# Patient Record
Sex: FEMALE | Race: WHITE
[De-identification: names, ages, dates, MRNs, and addresses within clinical notes are randomized per-mention and may not be internally consistent; named-entity substitution may affect disease eponyms.]

---

## 2017-01-02 ENCOUNTER — HOSPITAL ENCOUNTER (EMERGENCY)
Dept: HOSPITAL 58 - ED | Age: 72
Discharge: HOME | End: 2017-01-02

## 2017-01-02 VITALS — BODY MASS INDEX: 20.6 KG/M2

## 2017-01-02 VITALS — DIASTOLIC BLOOD PRESSURE: 68 MMHG | SYSTOLIC BLOOD PRESSURE: 127 MMHG | TEMPERATURE: 98.9 F

## 2017-01-02 DIAGNOSIS — J18.9: Primary | ICD-10-CM

## 2017-01-02 DIAGNOSIS — Z79.899: ICD-10-CM

## 2017-01-02 LAB
FLU INTERNAL QC: (no result)
FLUAV AG NPH QL: NEGATIVE
FLUBV AG NPH QL: NEGATIVE

## 2017-01-02 PROCEDURE — 99283 EMERGENCY DEPT VISIT LOW MDM: CPT

## 2017-01-02 PROCEDURE — 87880 STREP A ASSAY W/OPTIC: CPT

## 2017-01-02 PROCEDURE — 96372 THER/PROPH/DIAG INJ SC/IM: CPT

## 2017-01-02 PROCEDURE — 87804 INFLUENZA ASSAY W/OPTIC: CPT

## 2017-01-02 PROCEDURE — 87651 STREP A DNA AMP PROBE: CPT

## 2017-01-02 NOTE — ED.PDOC
General


ED Provider: 


Dr. RICKIE WILSON JR





Chief Complaint: Cough


Stated Complaint: Frequent cough. Swollen throat. Hurts to swallow. Had flu 

vaccine this year. Cough worse at noc. Difficult to sleep.[End]4 days 98.9 82 

20 96% 127/68 10/10 hysterectomy, breast biopsy, gb surg, tonsillectomy, foot 

surgery.  [ End ]osteoporosis arth


Time Seen by Physician: 10:10


Mode of Arrival: Walk-In


Information Source: Patient


Exam Limitations: No limitations


Primary Care Provider: 


LUCIANO CASPER





Nursing and Triage Documentation Reviewed and Agree: No





Review of Systems





- Review Of Systems


Constitutional: Reports: No symptoms


Eyes: Reports: No symptoms


Ears, Nose, Mouth, Throat: Reports: Throat pain


Respiratory: Reports: Cough


Cardiac: Reports: No symptoms


GI: Reports: No symptoms


: Reports: No symptoms


Musculoskeletal: Reports: No symptoms


Skin: Reports: No symptoms


Neurological: Reports: No symptoms


Endocrine: Reports: No symptoms


Hematologic/Lymphatic: Reports: No symptoms


All Other Systems: Other





Past Medical History





- Past Medical History


Endocrine: Reports: None


Cardiovascular: Reports: None


Respiratory: Reports: Pneumonia (?2015)


Hematological: Reports: None


Gastrointestinal: Reports: None


Genitourinary: Reports: None


Neuro/Psych: Reports: None


Musculoskeletal: Reports: None


Cancer: Reports: None


Last Menstrual Period: hysterectomy





- Surgical History


General Surgical History: Reports: None, Hysterectomy





- Family History


Family History: Reports: Other (daughter with [employer] teaches , daughter in 

law with illness and two grandchildren with urti symptoms and sore throats)





- Social History


Smoking Status: Never smoker


Hx Substance Use: No


Alcohol Screening: None





Physical Exam





- Physical Exam


Appearance: Well-appearing


Ill-appearing: Mild


Pain Distress: Mild


Eyes: NICHOLAS, EOMI, Conjunctiva clear


ENT: Ears normal, Nose normal, Oropharynx normal


Neck: Supple


Respiratory: Rhonchi


Cardiovascular: RRR, Pulses normal, No rub, No murmur


GI/: Soft, Nontender, No masses, Bowel sounds normal, No Organomegaly


Musculoskeletal: Normal strength, ROM intact, No edema, No calf tenderness


Skin: Warm, Dry, Normal color


Neurological: Sensation intact, Motor intact, Reflexes intact, Cranial nerves 

intact, Alert, Oriented


Psychiatric: Affect appropriate, Mood appropriate





Interpretation





- Radiology Interpretation


Radiology Interpretation By: Radiologist


Radiology Results: Negative


Exam Interpreted: CXR





Critical Care Note





- Critical Care Note


Total Time (mins): 0





Course





- Course


Orders, Labs, Meds: 


Lab Review











  01/02/17





  10:45


 


Influenza A (Rapid)  Negative


 


Influenza B (Rapid)  Negative








Orders











 Category Date Time Status


 


 MOLECULAR GROUP A STREP Stat LAB  01/02/17 10:45 Results


 


 RAPID FLU A/B Stat LAB  01/02/17 10:45 Completed


 


 STREP SCREEN Stat LAB  01/02/17 10:45 Results


 


 Benzonatate [Tessalon Perles] MEDS  01/02/17 10:09 Discontinued





 200 mg PO ONCE STA   


 


 Methylprednisolone Sod Succ/Pf [Solu-Medrol 125 mg] MEDS  01/02/17 11:49 

Discontinued





 125 mg IM ONCE STA   


 


 Sulfamethoxazole/Trimethoprim [Bactrim Ds 800/160 mg] MEDS  01/02/17 11:50 

Discontinued





 1 tab PO ONCE STA   


 


 CHEST, 2 VIEWS PA & LAT Stat RADS  01/02/17 10:07 Completed








Medications














Discontinued Medications














Generic Name Dose Route Start Last Admin





  Trade Name Freq  PRN Reason Stop Dose Admin


 


Benzonatate  200 mg  01/02/17 10:09  01/02/17 10:36





  Tessalon Perles  PO  01/02/17 10:10  100 mg





  ONCE STA   Administration


 


Methylprednisolone Sodium Succinate  125 mg  01/02/17 11:49  01/02/17 12:02





  Solu-Medrol 125 Mg  IM  01/02/17 11:50  125 mg





  ONCE STA   Administration


 


Trimethoprim/Sulfamethoxazole  1 tab  01/02/17 11:50  01/02/17 12:03





  Bactrim Ds 800/160 Mg  PO  01/02/17 11:51  1 tab





  ONCE STA   Administration











Vital Signs: 


 











  Temp Pulse Resp BP Pulse Ox


 


 01/02/17 09:26  98.9 F  82  20  127/68  96














Departure





- Departure


Time of Disposition: 11:50


Disposition: HOME SELF-CARE


Discharge Problem: 


 Pneumonia





Instructions:  Pneumonitis (ED), Community Acquired Pneumonia (ED)


Condition: Good


Pt referred to PMD for follow-up: Yes


Additional Instructions: 


return if fever over 101.0 or if worsening


Solumedrol given by injection


Analislon Perles or Robitussin AC for cough


Bactrim antibiotic until gone


Please follow-up with Dr. Casper in 1-5 days.





Prescriptions: 


Sulfamethoxazole/Trimethoprim [Bactrim Ds Tablet] 1 tab PO Q12HR #20 tablet


Benzonatate [Tessalon Perles] 200 mg PO TID PRN #20 capsule


 PRN Reason: Cough


Guaifenesin/Codeine Phosphate [Robitussin AC Syrup] 10 ml PO Q6H PRN #240 ml


 PRN Reason: Cough


Allergies/Adverse Reactions: 


Allergies





Penicillins Adverse Reaction (Verified 01/02/17 09:36)


 








Home Medications: 


Ambulatory Orders





Alprazolam [Xanax] 1 tab PO QPM PRN 01/27/14 


Azelastine HCl [Optivar] 1 drop EACHEYE BID 01/27/14 


Bimatoprost Opth [Lumigan] 1 drop EACHEYE BID 01/27/14 


Dorzolamide HCl/Timolol Maleat [Dorzolamide-Timolol Eye Drops] 1 drop EACHEYE 

TID 01/27/14 


Metoprolol Succinate [Toprol Xl] 1 tab PO BID 01/27/14 


Benzonatate [Tessalon Perles] 200 mg PO TID PRN #20 capsule 01/02/17 


Guaifenesin/Codeine Phosphate [Robitussin AC Syrup] 10 ml PO Q6H PRN #240 ml 01/ 02/17 


Montelukast Sodium [Singulair] 10 mg PO DAILY PRN 01/02/17 


Rosuvastatin Calcium [Crestor] 10 mg PO EVERY OTHER DAY 01/02/17 


Sulfamethoxazole/Trimethoprim [Bactrim Ds Tablet] 1 tab PO Q12HR #20 tablet 01/ 02/17

## 2017-01-02 NOTE — DI
EXAM:  Chest two views 

  

HISTORY:  Cough 

  

COMPARISON:  01/08/2015 

  

TECHNIQUE:  Two views of the chest were performed 

  

FINDINGS:  No airspace consolidation.  There is granulomatous calcification There is emphysematous c
hange There is no pleural effusion or pneumothorax.  The heart is normal in size.  The mediastinal c
ontour is normal.  There are no acute abnormalities of the bones. 

  

IMPRESSION:  No acute cardiopulmonary process. Emphysematous change.

## 2017-01-30 ENCOUNTER — OFFICE VISIT (OUTPATIENT)
Dept: OBSTETRICS AND GYNECOLOGY | Facility: CLINIC | Age: 72
End: 2017-01-30

## 2017-01-30 VITALS
SYSTOLIC BLOOD PRESSURE: 120 MMHG | HEIGHT: 61 IN | WEIGHT: 114 LBS | DIASTOLIC BLOOD PRESSURE: 60 MMHG | BODY MASS INDEX: 21.52 KG/M2

## 2017-01-30 DIAGNOSIS — R39.15 URGENCY OF URINATION: Primary | ICD-10-CM

## 2017-01-30 PROCEDURE — 99213 OFFICE O/P EST LOW 20 MIN: CPT | Performed by: OBSTETRICS & GYNECOLOGY

## 2017-01-30 NOTE — MR AVS SNAPSHOT
Michel Bookerzuleyma   2017 10:00 AM   Office Visit    Dept Phone:  [phone]   Encounter #:  [id number]    Provider:  Mirian Salinas MD   Department:  [facility]                Your Full Care Plan              Your Updated Medication List          This list is accurate as of: 17 10:52 AM.  Always use your most recent med list.                ALPHAGAN P 0.1 % solution ophthalmic solution   Generic drug:  brimonidine       ALPRAZolam 0.5 MG tablet   Commonly known as:  XANAX       aspirin 81 MG tablet       CALCIUM + D PO       conjugated estrogens 0.625 MG/GM vaginal cream   Commonly known as:  PREMARIN   Insert  into the vagina 2 (Two) Times a Week.  applicator       CRESTOR PO       dorzolamide 2 % ophthalmic solution   Commonly known as:  TRUSOPT       methylcellulose (Laxative) 500 MG tablet tablet   Commonly known as:  CITRUCEL       metoprolol succinate XL 25 MG 24 hr tablet   Commonly known as:  TOPROL-XL       montelukast 10 MG tablet   Commonly known as:  SINGULAIR       MULTIVITAMIN ADULT PO       PROLIA 60 MG/ML solution syringe   Generic drug:  denosumab       solifenacin 5 MG tablet   Commonly known as:  VESICARE   Take 1 tablet by mouth Daily.       taflurprost 0.0015 % solution ophthalmic solution   Commonly known as:  ZIOPTAN               Instructions     None    Patient Instructions History      Upcoming Appointments     Visit Type Date Time Department    OFFICE VISIT 2017 10:00 AM LULU SALGADO      [employer] Signup     [facility] [employer] allows you to send messages to your doctor, view your test results, renew your prescriptions, schedule appointments, and more. To sign up, go to [employer] and click on the Sign Up Now link in the New User? box. Enter your [employer] Activation Code exactly as it appears below along with the last four digits of your Social Security Number and your Date of Birth () to complete  "the sign-up process. If you do not sign up before the expiration date, you must request a new code.    [employer] Activation Code: [email or web address]  Expires: 2/13/2017 10:52 AM    If you have questions, you can email [email or web address] or call [phone] to talk to our [employer] staff. Remember, [employer] is NOT to be used for urgent needs. For medical emergencies, dial 911.               Other Info from Your Visit           Your Appointments     Feb 01, 2018 10:00 AM CST   Physical with Mirian Salinas MD   [facility] [place] [facility] (--)    [address] [place] [id number] [phone]           Arrive 15 minutes prior to appointment.              Allergies     Penicillins  Rash      Reason for Visit     Follow-up Pt here for 1 month follow up on medication. Pt states they are working well for her. Pt voices no complaints or concerns.       Vital Signs     Blood Pressure Height Weight Body Mass Index Smoking Status       120/60 61\" (154.9 cm) 114 lb (51.7 kg) 21.54 kg/m2 Never Smoker         "

## 2017-01-30 NOTE — PROGRESS NOTES
Darlene Cruz is a 72 y.o. female for a medication check.    History of Present Illness  Pt is a 73 yo WF who was evaluated 1 month ago for urgency and was found to have an atrophic vagina.  She was placed on Premarin Vaginal cream and Vesicare 5 mg.    The following portions of the patient's history were reviewed and updated as appropriate: allergies, current medications, past family history, past medical history, past social history, past surgical history and problem list.    Review of Systems   All other systems reviewed and are negative.      Objective   Physical Exam   Constitutional: She is oriented to person, place, and time. She appears well-developed and well-nourished.   HENT:   Head: Normocephalic.   Eyes: Pupils are equal, round, and reactive to light.   Neck: Normal range of motion.   Cardiovascular: Normal rate.    Pulmonary/Chest: Effort normal.   Abdominal: Soft.   Genitourinary: Uterus normal. Rectal exam shows anal tone normal. Pelvic exam was performed with patient supine. No labial fusion. There is no rash, tenderness, lesion or injury on the right labia. There is no rash, tenderness, lesion or injury on the left labia. Uterus is not enlarged and not tender. Cervix exhibits no motion tenderness, no discharge and no friability. Right adnexum displays no mass, no tenderness and no fullness. Left adnexum displays no mass, no tenderness and no fullness. No erythema, tenderness or bleeding in the vagina. No signs of injury around the vagina. No vaginal discharge found.   Genitourinary Comments: Atrophy of vaginal wall is improving   Musculoskeletal: Normal range of motion.   Neurological: She is alert and oriented to person, place, and time.   Skin: Skin is warm and dry.   Psychiatric: She has a normal mood and affect.   Nursing note and vitals reviewed.      Assessment/Plan   Vaginal atrophy is much improved as well as is her urgency.  Continue Premarin Vaginal Cream and  Vesicare.

## 2017-05-15 ENCOUNTER — HOSPITAL ENCOUNTER (OUTPATIENT)
Dept: HOSPITAL 58 - OPMED | Age: 72
Discharge: HOME | End: 2017-05-15
Attending: INTERNAL MEDICINE

## 2017-05-15 VITALS — BODY MASS INDEX: 20.6 KG/M2

## 2017-05-15 VITALS — SYSTOLIC BLOOD PRESSURE: 123 MMHG | DIASTOLIC BLOOD PRESSURE: 59 MMHG | TEMPERATURE: 97.7 F

## 2017-05-15 DIAGNOSIS — M81.0: Primary | ICD-10-CM

## 2017-05-15 PROCEDURE — 96372 THER/PROPH/DIAG INJ SC/IM: CPT

## 2017-09-05 ENCOUNTER — HOSPITAL ENCOUNTER (OUTPATIENT)
Dept: HOSPITAL 58 - RAD | Age: 72
Discharge: HOME | End: 2017-09-05
Attending: INTERNAL MEDICINE

## 2017-09-05 VITALS — BODY MASS INDEX: 20.6 KG/M2

## 2017-09-05 DIAGNOSIS — R07.9: Primary | ICD-10-CM

## 2017-09-05 NOTE — DI
EXAM: PA and lateral views of the chest 

  

HISTORY:  Chest pain and pressure 

  

COMPARISON:  Chest x-ray 01/02/2017 and multiple priors 

  

FINDINGS:  The cardiomediastinal silhouette is normal.  There is no pneumothorax or pleural effusion
.  There is no consolidation, nodule or mass. Lungs are mildly hyperinflated. The osseous structures
 are unremarkable. There are surgical clips in right upper quadrant. 

  

IMPRESSION:  No acute cardiopulmonary process with findings suggestive of chronic obstructive pulmon
mili disease.

## 2017-10-24 ENCOUNTER — HOSPITAL ENCOUNTER (OUTPATIENT)
Dept: HOSPITAL 58 - RAD | Age: 72
Discharge: HOME | End: 2017-10-24
Attending: SPECIALIST

## 2017-10-24 VITALS — BODY MASS INDEX: 20.6 KG/M2

## 2017-10-24 DIAGNOSIS — Z12.31: Primary | ICD-10-CM

## 2017-10-24 PROCEDURE — 77067 SCR MAMMO BI INCL CAD: CPT

## 2017-10-25 NOTE — MAMMO
EXAM:  Bilateral digital screening mammogram (2-D and 3-D) 

  

Comparison:  Bilateral mammogram 10/20/2016 

  

History:  Screening 

  

Findings:  MLO and CC views of bilateral breasts demonstrate scattered fibroglandular breast parenchy
ma.  CAD was reviewed by the radiologist.  Tomosynthesis was performed. There are no dominant masses,
 no suspicious microcalcifications and no architectural distortions 

  

Impression:  Stable negative mammogram.  Recommend followup routine screening mammography in 1 year. 


  

BIRADS 1

## 2018-01-17 ENCOUNTER — HOSPITAL ENCOUNTER (OUTPATIENT)
Dept: HOSPITAL 58 - OUTPT | Age: 73
End: 2018-01-17
Attending: OTOLARYNGOLOGY

## 2018-01-17 VITALS — BODY MASS INDEX: 20.6 KG/M2

## 2018-01-17 DIAGNOSIS — R42: Primary | ICD-10-CM

## 2018-01-17 PROCEDURE — 92567 TYMPANOMETRY: CPT

## 2018-01-17 PROCEDURE — 92557 COMPREHENSIVE HEARING TEST: CPT

## 2018-01-22 RX ORDER — SOLIFENACIN SUCCINATE 5 MG/1
TABLET, FILM COATED ORAL
Qty: 30 TABLET | Refills: 0 | Status: SHIPPED | OUTPATIENT
Start: 2018-01-22 | End: 2018-02-01 | Stop reason: SDUPTHER

## 2018-02-01 ENCOUNTER — OFFICE VISIT (OUTPATIENT)
Dept: OBSTETRICS AND GYNECOLOGY | Facility: CLINIC | Age: 73
End: 2018-02-01

## 2018-02-01 VITALS
SYSTOLIC BLOOD PRESSURE: 128 MMHG | HEIGHT: 61 IN | BODY MASS INDEX: 20.58 KG/M2 | WEIGHT: 109 LBS | DIASTOLIC BLOOD PRESSURE: 72 MMHG

## 2018-02-01 DIAGNOSIS — Z01.419 ENCOUNTER FOR GYNECOLOGICAL EXAMINATION WITHOUT ABNORMAL FINDING: Primary | ICD-10-CM

## 2018-02-01 PROCEDURE — G0101 CA SCREEN;PELVIC/BREAST EXAM: HCPCS | Performed by: OBSTETRICS & GYNECOLOGY

## 2018-02-01 RX ORDER — DIAZEPAM 5 MG/1
5 TABLET ORAL EVERY 12 HOURS PRN
COMMUNITY
End: 2021-10-06

## 2018-02-01 RX ORDER — SOLIFENACIN SUCCINATE 5 MG/1
5 TABLET, FILM COATED ORAL DAILY
Qty: 90 TABLET | Refills: 3 | Status: SHIPPED | OUTPATIENT
Start: 2018-02-01 | End: 2021-10-06

## 2018-02-01 NOTE — PROGRESS NOTES
Darlene Cruz is a 73 y.o. female who presents for a yearly gyn exam.    History of Present Illness  Patient has had a hysterectomy and she is on Premarin vaginal cream, Vesicare.  The following portions of the patient's history were reviewed and updated as appropriate: allergies, current medications, past family history, past medical history, past social history, past surgical history and problem list.    Review of Systems   Constitutional: Negative for fatigue and unexpected weight change.   HENT: Negative.    Eyes: Negative.    Respiratory: Negative for cough, chest tightness and shortness of breath.    Cardiovascular: Negative for chest pain, palpitations and leg swelling.   Gastrointestinal: Negative for abdominal distention, abdominal pain, anal bleeding, blood in stool, constipation, diarrhea, nausea and vomiting.   Endocrine: Negative for polydipsia and polyuria.   Genitourinary: Positive for urgency. Negative for difficulty urinating, dyspareunia, dysuria, frequency, genital sores, hematuria, menstrual problem, pelvic pain, vaginal bleeding, vaginal discharge and vaginal pain.   Musculoskeletal: Negative.    Skin: Negative for color change and rash.   Allergic/Immunologic: Negative.    Neurological: Negative.  Negative for dizziness, seizures, syncope, light-headedness and headaches.   Hematological: Negative for adenopathy. Does not bruise/bleed easily.   Psychiatric/Behavioral: Negative for agitation, confusion, dysphoric mood, sleep disturbance and suicidal ideas. The patient is not nervous/anxious.        Objective   Physical Exam   Constitutional: She is oriented to person, place, and time. She appears well-developed and well-nourished.   HENT:   Head: Normocephalic.   Eyes: Conjunctivae are normal. Pupils are equal, round, and reactive to light. Right eye exhibits no discharge. Left eye exhibits no discharge.   Neck: Normal range of motion. Neck supple. No tracheal deviation present. No  thyromegaly present.   Cardiovascular: Normal rate, regular rhythm and normal heart sounds.    Pulmonary/Chest: Effort normal and breath sounds normal. She has no wheezes. She has no rales. Right breast exhibits no inverted nipple, no mass, no nipple discharge, no skin change and no tenderness. Left breast exhibits no inverted nipple, no mass, no nipple discharge, no skin change and no tenderness. Breasts are symmetrical. There is no breast swelling.   Abdominal: Soft. Bowel sounds are normal. She exhibits no distension and no mass. There is no tenderness. There is no rebound and no guarding. No hernia. Hernia confirmed negative in the right inguinal area and confirmed negative in the left inguinal area.   Genitourinary: Rectum normal and vagina normal. Rectal exam shows no external hemorrhoid, no internal hemorrhoid, no fissure, no mass, no tenderness and anal tone normal. No breast tenderness, discharge or bleeding. Pelvic exam was performed with patient supine. No labial fusion. There is no rash, tenderness, lesion or injury on the right labia. There is no rash, tenderness, lesion or injury on the left labia. Cervix exhibits no motion tenderness, no discharge and no friability. Right adnexum displays no mass, no tenderness and no fullness. Left adnexum displays no mass, no tenderness and no fullness. No erythema, tenderness or bleeding in the vagina. No foreign body in the vagina. No signs of injury around the vagina. No vaginal discharge found.   Genitourinary Comments: BUS glands appear nl, perineum intact, urethral orifice appears nl, vagina has good support. Uterus is absent and vagina is atrophic.   Musculoskeletal: Normal range of motion.   Lymphadenopathy:        Right: No inguinal adenopathy present.        Left: No inguinal adenopathy present.   Neurological: She is alert and oriented to person, place, and time. She has normal reflexes.   Skin: Skin is warm and dry. No rash noted.   Psychiatric: She has  a normal mood and affect. Her behavior is normal. Judgment and thought content normal.   Nursing note and vitals reviewed.        Assessment/Plan     WWE  Mammo done and followed by Dr Nesbitt  Continue Vesicare 5 mg  Continue Premarin Vaginal cream.

## 2018-09-17 ENCOUNTER — OFFICE VISIT (OUTPATIENT)
Dept: UROLOGY | Age: 73
End: 2018-09-17
Payer: MEDICARE

## 2018-09-17 VITALS
HEART RATE: 63 BPM | BODY MASS INDEX: 19.51 KG/M2 | HEIGHT: 62 IN | TEMPERATURE: 96.3 F | DIASTOLIC BLOOD PRESSURE: 65 MMHG | SYSTOLIC BLOOD PRESSURE: 106 MMHG | WEIGHT: 106 LBS

## 2018-09-17 DIAGNOSIS — R35.0 URINARY FREQUENCY: Primary | ICD-10-CM

## 2018-09-17 DIAGNOSIS — N32.81 OAB (OVERACTIVE BLADDER): ICD-10-CM

## 2018-09-17 LAB
BILIRUBIN, POC: NORMAL
BLOOD URINE, POC: NORMAL
CLARITY, POC: CLEAR
COLOR, POC: YELLOW
GLUCOSE URINE, POC: NORMAL
KETONES, POC: NORMAL
LEUKOCYTE EST, POC: NORMAL
NITRITE, POC: NORMAL
PH, POC: 5.5
PROTEIN, POC: NORMAL
SPECIFIC GRAVITY, POC: 1.03
UROBILINOGEN, POC: 0.2

## 2018-09-17 PROCEDURE — 51798 US URINE CAPACITY MEASURE: CPT | Performed by: PHYSICIAN ASSISTANT

## 2018-09-17 PROCEDURE — 3017F COLORECTAL CA SCREEN DOC REV: CPT | Performed by: PHYSICIAN ASSISTANT

## 2018-09-17 PROCEDURE — 99202 OFFICE O/P NEW SF 15 MIN: CPT | Performed by: PHYSICIAN ASSISTANT

## 2018-09-17 PROCEDURE — 1101F PT FALLS ASSESS-DOCD LE1/YR: CPT | Performed by: PHYSICIAN ASSISTANT

## 2018-09-17 PROCEDURE — 1090F PRES/ABSN URINE INCON ASSESS: CPT | Performed by: PHYSICIAN ASSISTANT

## 2018-09-17 PROCEDURE — 1036F TOBACCO NON-USER: CPT | Performed by: PHYSICIAN ASSISTANT

## 2018-09-17 PROCEDURE — 1123F ACP DISCUSS/DSCN MKR DOCD: CPT | Performed by: PHYSICIAN ASSISTANT

## 2018-09-17 PROCEDURE — G8400 PT W/DXA NO RESULTS DOC: HCPCS | Performed by: PHYSICIAN ASSISTANT

## 2018-09-17 PROCEDURE — G8428 CUR MEDS NOT DOCUMENT: HCPCS | Performed by: PHYSICIAN ASSISTANT

## 2018-09-17 PROCEDURE — 81003 URINALYSIS AUTO W/O SCOPE: CPT | Performed by: PHYSICIAN ASSISTANT

## 2018-09-17 PROCEDURE — 4040F PNEUMOC VAC/ADMIN/RCVD: CPT | Performed by: PHYSICIAN ASSISTANT

## 2018-09-17 PROCEDURE — G8420 CALC BMI NORM PARAMETERS: HCPCS | Performed by: PHYSICIAN ASSISTANT

## 2018-09-17 RX ORDER — METOPROLOL SUCCINATE 25 MG/1
TABLET, EXTENDED RELEASE ORAL
COMMUNITY
Start: 2016-12-02

## 2018-09-17 RX ORDER — DIAZEPAM 5 MG/1
5 TABLET ORAL
COMMUNITY
End: 2019-02-07

## 2018-09-17 RX ORDER — MONTELUKAST SODIUM 10 MG/1
10 TABLET ORAL
COMMUNITY
End: 2019-02-07

## 2018-09-17 RX ORDER — ROSUVASTATIN CALCIUM 10 MG/1
0.5 TABLET, COATED ORAL
COMMUNITY

## 2018-09-17 RX ORDER — ALPRAZOLAM 0.5 MG/1
0.5 TABLET ORAL
COMMUNITY

## 2018-09-17 RX ORDER — CYANOCOBALAMIN (VITAMIN B-12) 1000 MCG
600 TABLET, EXTENDED RELEASE ORAL
COMMUNITY

## 2018-09-17 ASSESSMENT — ENCOUNTER SYMPTOMS
BLURRED VISION: 0
EYE PAIN: 0
SINUS PAIN: 0
VOMITING: 0
SHORTNESS OF BREATH: 0
ABDOMINAL PAIN: 0
BACK PAIN: 0
WHEEZING: 0

## 2018-09-19 ENCOUNTER — HOSPITAL ENCOUNTER (OUTPATIENT)
Dept: HOSPITAL 58 - CAR | Age: 73
Discharge: HOME | End: 2018-09-19
Attending: INTERNAL MEDICINE

## 2018-09-19 VITALS — BODY MASS INDEX: 20.6 KG/M2

## 2018-09-19 DIAGNOSIS — R07.9: ICD-10-CM

## 2018-09-19 DIAGNOSIS — R06.02: Primary | ICD-10-CM

## 2018-09-20 ENCOUNTER — HOSPITAL ENCOUNTER (OUTPATIENT)
Dept: HOSPITAL 58 - CAR | Age: 73
Discharge: HOME | End: 2018-09-20
Attending: INTERNAL MEDICINE

## 2018-09-20 VITALS — BODY MASS INDEX: 20.6 KG/M2

## 2018-09-20 DIAGNOSIS — R07.9: ICD-10-CM

## 2018-09-20 DIAGNOSIS — R06.02: Primary | ICD-10-CM

## 2018-09-20 NOTE — STRESSMOD
Date of Test:  9/20/18  Ordering Physician: DR. LUCIANO CASPER   Occupation:
RETIRED

Reason for Exam: CHEST PAIN, SOB    Height: 62"   Weight: 107 LBS

Current Medications: TOPROL XL, CRESTOR, XANAX, SINGULAIR, DIAZEPAM            
               

Target Heart Rate: 124 BPM           Resting EKG: SINUS RHYTHM/ NO ACUTE CHANGES



                                                                               
                                                                     S-T SEGMENT





STAGE MPH/GRADE HEART RATE BPM BLOOD PRESSURE mmhg RHYTHM +/- ELEVATION 
DEPRESSION SYMPTOMS,COMMENTS

 

At Rest  54 BPM 98/68 MMHG SR X   NONE

 

1 1.7/0% 90 /40 MMHG SR X   NONE

 

2 1.7/5% 100 /40 MMHG SR X   NONE

 

3 1.7/10%  132/50 MMHG SR X   NONE

 

4 2.5/12%       

 

5 3.4/14%       

 

Immediately

After  

128 BPM 

 

SR 

X 

 

 

FATIGUE

 

Minutes Post Exercise 

 

 

 

 

 

 

 



 

Minutes Post 

Exercise 

 

 

 

 

 

  





 DURATION OF EXERCISE: 10:00        MAXIMUM HEART RATE REACHED: 128 BPM     
REASON FOR TERMINATION: FATIGUE



98% OXYGEN SATURATION WITH EXERCISE ON ROOM AIR          METS 7.0



INTERPRETATION: 

1. NO EVIDENCE OF ISCHEMIA BY ST-T WAVE

2. NO CHEST PAIN OR CHEST DISCOMFORT

3. BLOOD PRESSURE RESPONSE: NORMAL AT REST AND WITH EXERCISE

4. NO ARRHYTHMIAS

NORMAL LEFT VENTRICULAR CONTRACTILITY--RESTING AND POST EXERCISE



MTDD

## 2018-09-20 NOTE — ECHOSTRESS
Date of Exam: 9/20/18   Ordering Physician: DR. LUCIANO CASPER

Reason for Echo: CHEST PAIN, SOB, STRESS TEST--NO ISCHEMIA







M-Mode  Normal Adult Results LV Dimensions Normal Adult Results

 

 AoV Opening excursions       >1.6   LVEDD-base-    3.5-5.8  

 

Ao root dimensions     2.0-3.7   LVESD-base-    3.1-4.6  

 

L. Atrium dimensions     1.9-3.8   Post. Wall thickness    0.8-1.1  

 

IV septum (thickness)     0.7-1.2   Post. Wall excursion    0.72-1.3  

 

 Septal motion    Systolic motion   

 

 R. Ventricular cavity    1.5-2.0    LVEF      60%  

 

Paradoxical septal wall motion       



2-D: NORMAL LEFT VENTRICULAR CONTRACTILITY--RESTING AND POST EXERCISE 





M-MODE:

MV: 

AV:  

TV:  

PV:  

CHAMBER SIZE: 

WALL MOTION:  NORMAL LEFT VENTRICULAR CONTRACTILITY--RESTING AND POST EXERCISE 

PERICARDIUM:  

_______________________________________________________

INTERPRETATION:  

1.  NORMAL LEFT VENTRICULAR CONTRACTILITY--RESTING AND POST EXERCISE 

MTDD

## 2018-09-20 NOTE — ECHO2D
Date of Exam: 9/19/18   Ordering Physician: DR. LUCIANO CASPER             Room #:
 OP

Reason for Echo: CHEST PAIN, SHORTNESS OF BREATH







M-Mode  Normal Adult Results LV Dimensions Normal Adult Results

 

AoV Opening excursions       >1.6  >1.6 LVEDD-base-    3.5-5.8  3.3

 

Ao root dimensions     2.0-3.7  3.5 LVESD-base-    3.1-4.6  

 

L. Atrium dimensions     1.9-3.8  3.0 Post. Wall thickness    0.8-1.1  1.0

 

IV septum (thickness)     0.7-1.2  1.0 Post. Wall excursion    0.72-1.3  NORMAL

 

Septal motion   NORMAL Systolic motion   

 

R. Ventricular cavity    1.5-2.0  NORMAL LVEF      60%  61%

 

Paradoxical septal wall motion   NORMAL    



2-D : 2-D M Mode Echocardiogram was performed using apical four chamber and 
left parasternal long and short axis views.  Mitral, tricuspid and aortic 
valves appear to be normal.  Contractility of the left ventricle seems to be 
normal, so is the cavity size.  Left atrial cavity size and aortic root appear 
to be normal.  There is no pericardial effusion.  There is no thrombus noted in 
the left ventricular or left aortic cavity.  No mitral valve prolapse noted. 



M-MODE:

MV:  NORMAL

AV:  NORMAL

TV:  NORMAL

PV:  

CHAMBER SIZE:  NORMAL

WALL MOTION:  NORMAL

PERICARDIUM:  NORMAL



INTERPRETATION:  

1. NORMAL 2 "D" "M" MODE ECHO

MTDD

## 2018-10-25 ENCOUNTER — HOSPITAL ENCOUNTER (OUTPATIENT)
Dept: HOSPITAL 58 - RAD | Age: 73
Discharge: HOME | End: 2018-10-25
Attending: INTERNAL MEDICINE

## 2018-10-25 VITALS — BODY MASS INDEX: 20.6 KG/M2

## 2018-10-25 DIAGNOSIS — Z12.31: Primary | ICD-10-CM

## 2018-10-25 PROCEDURE — 77067 SCR MAMMO BI INCL CAD: CPT

## 2018-10-26 NOTE — MAMMO
EXAM:  Bilateral digital screening mammogram (2-D and 3-D) 

  

History:  Screening 

  

Comparison:  Bilateral mammogram 10/24/2017 

  

Findings:  MLO and CC views of bilateral breast demonstrate scattered fibroglandular breast parenchym
a.  CAD was reviewed by the radiologist.  Tomosynthesis was performed. There are no dominant masses, 
no suspicious microcalcifications and no architectural distortions 

  

Impression:  Stable negative mammogram.  Recommend followup routine screening mammography in 1 year. 


  

BIRADS 1

## 2018-12-17 ENCOUNTER — OFFICE VISIT (OUTPATIENT)
Dept: UROLOGY | Age: 73
End: 2018-12-17
Payer: MEDICARE

## 2018-12-17 VITALS — HEIGHT: 62 IN | TEMPERATURE: 96.2 F | BODY MASS INDEX: 20.43 KG/M2 | WEIGHT: 111 LBS

## 2018-12-17 DIAGNOSIS — N32.81 OAB (OVERACTIVE BLADDER): Primary | ICD-10-CM

## 2018-12-17 DIAGNOSIS — R35.0 URINARY FREQUENCY: ICD-10-CM

## 2018-12-17 PROCEDURE — G8427 DOCREV CUR MEDS BY ELIG CLIN: HCPCS | Performed by: PHYSICIAN ASSISTANT

## 2018-12-17 PROCEDURE — 4040F PNEUMOC VAC/ADMIN/RCVD: CPT | Performed by: PHYSICIAN ASSISTANT

## 2018-12-17 PROCEDURE — 1036F TOBACCO NON-USER: CPT | Performed by: PHYSICIAN ASSISTANT

## 2018-12-17 PROCEDURE — 1101F PT FALLS ASSESS-DOCD LE1/YR: CPT | Performed by: PHYSICIAN ASSISTANT

## 2018-12-17 PROCEDURE — G8484 FLU IMMUNIZE NO ADMIN: HCPCS | Performed by: PHYSICIAN ASSISTANT

## 2018-12-17 PROCEDURE — G8400 PT W/DXA NO RESULTS DOC: HCPCS | Performed by: PHYSICIAN ASSISTANT

## 2018-12-17 PROCEDURE — 1090F PRES/ABSN URINE INCON ASSESS: CPT | Performed by: PHYSICIAN ASSISTANT

## 2018-12-17 PROCEDURE — 1123F ACP DISCUSS/DSCN MKR DOCD: CPT | Performed by: PHYSICIAN ASSISTANT

## 2018-12-17 PROCEDURE — 3017F COLORECTAL CA SCREEN DOC REV: CPT | Performed by: PHYSICIAN ASSISTANT

## 2018-12-17 PROCEDURE — 99213 OFFICE O/P EST LOW 20 MIN: CPT | Performed by: PHYSICIAN ASSISTANT

## 2018-12-17 PROCEDURE — G8420 CALC BMI NORM PARAMETERS: HCPCS | Performed by: PHYSICIAN ASSISTANT

## 2018-12-17 ASSESSMENT — ENCOUNTER SYMPTOMS
SINUS PAIN: 0
ABDOMINAL PAIN: 0
VOMITING: 0
BACK PAIN: 0
SHORTNESS OF BREATH: 0
EYE PAIN: 0
WHEEZING: 0

## 2019-02-07 ENCOUNTER — APPOINTMENT (OUTPATIENT)
Dept: CT IMAGING | Age: 74
End: 2019-02-07
Payer: MEDICARE

## 2019-02-07 ENCOUNTER — APPOINTMENT (OUTPATIENT)
Dept: GENERAL RADIOLOGY | Age: 74
End: 2019-02-07
Payer: MEDICARE

## 2019-02-07 ENCOUNTER — HOSPITAL ENCOUNTER (EMERGENCY)
Age: 74
Discharge: HOME | End: 2019-02-07
Attending: EMERGENCY MEDICINE
Payer: MEDICARE

## 2019-02-07 VITALS
HEIGHT: 64 IN | OXYGEN SATURATION: 94 % | SYSTOLIC BLOOD PRESSURE: 139 MMHG | RESPIRATION RATE: 23 BRPM | DIASTOLIC BLOOD PRESSURE: 64 MMHG | HEART RATE: 74 BPM | WEIGHT: 115 LBS | TEMPERATURE: 97.8 F | BODY MASS INDEX: 19.63 KG/M2

## 2019-02-07 DIAGNOSIS — R05.9 COUGH: ICD-10-CM

## 2019-02-07 DIAGNOSIS — R55 SYNCOPE AND COLLAPSE: Primary | ICD-10-CM

## 2019-02-07 DIAGNOSIS — R53.1 GENERAL WEAKNESS: ICD-10-CM

## 2019-02-07 LAB
ALBUMIN SERPL-MCNC: 4 G/DL (ref 3.5–5.2)
ALP BLD-CCNC: 70 U/L (ref 35–104)
ALT SERPL-CCNC: 12 U/L (ref 5–33)
ANION GAP SERPL CALCULATED.3IONS-SCNC: 8 MMOL/L (ref 7–19)
AST SERPL-CCNC: 15 U/L (ref 5–32)
BACTERIA: NEGATIVE /HPF
BASOPHILS ABSOLUTE: 0.1 K/UL (ref 0–0.2)
BASOPHILS RELATIVE PERCENT: 0.9 % (ref 0–1)
BILIRUB SERPL-MCNC: 0.6 MG/DL (ref 0.2–1.2)
BILIRUBIN URINE: NEGATIVE
BLOOD, URINE: NEGATIVE
BUN BLDV-MCNC: 12 MG/DL (ref 8–23)
CALCIUM SERPL-MCNC: 9.4 MG/DL (ref 8.8–10.2)
CHLORIDE BLD-SCNC: 105 MMOL/L (ref 98–111)
CLARITY: CLEAR
CO2: 28 MMOL/L (ref 22–29)
COLOR: YELLOW
CREAT SERPL-MCNC: 0.7 MG/DL (ref 0.5–0.9)
EKG P AXIS: 72 DEGREES
EKG P-R INTERVAL: 148 MS
EKG Q-T INTERVAL: 422 MS
EKG QRS DURATION: 74 MS
EKG QTC CALCULATION (BAZETT): 412 MS
EKG T AXIS: 72 DEGREES
EOSINOPHILS ABSOLUTE: 0.6 K/UL (ref 0–0.6)
EOSINOPHILS RELATIVE PERCENT: 7.8 % (ref 0–5)
EPITHELIAL CELLS, UA: 2 /HPF (ref 0–5)
GFR NON-AFRICAN AMERICAN: >60
GLUCOSE BLD-MCNC: 120 MG/DL (ref 74–109)
GLUCOSE URINE: NEGATIVE MG/DL
HCT VFR BLD CALC: 38.9 % (ref 37–47)
HEMOGLOBIN: 12.1 G/DL (ref 12–16)
HYALINE CASTS: 1 /HPF (ref 0–8)
KETONES, URINE: NEGATIVE MG/DL
LEUKOCYTE ESTERASE, URINE: ABNORMAL
LYMPHOCYTES ABSOLUTE: 1.6 K/UL (ref 1.1–4.5)
LYMPHOCYTES RELATIVE PERCENT: 19.9 % (ref 20–40)
MCH RBC QN AUTO: 28.3 PG (ref 27–31)
MCHC RBC AUTO-ENTMCNC: 31.1 G/DL (ref 33–37)
MCV RBC AUTO: 91.1 FL (ref 81–99)
MONOCYTES ABSOLUTE: 0.7 K/UL (ref 0–0.9)
MONOCYTES RELATIVE PERCENT: 8.3 % (ref 0–10)
NEUTROPHILS ABSOLUTE: 5 K/UL (ref 1.5–7.5)
NEUTROPHILS RELATIVE PERCENT: 62.7 % (ref 50–65)
NITRITE, URINE: NEGATIVE
PDW BLD-RTO: 12.4 % (ref 11.5–14.5)
PH UA: 6.5
PLATELET # BLD: 169 K/UL (ref 130–400)
PMV BLD AUTO: 12.1 FL (ref 9.4–12.3)
POTASSIUM SERPL-SCNC: 4.3 MMOL/L (ref 3.5–5)
PROTEIN UA: NEGATIVE MG/DL
RBC # BLD: 4.27 M/UL (ref 4.2–5.4)
RBC UA: 1 /HPF (ref 0–4)
SODIUM BLD-SCNC: 141 MMOL/L (ref 136–145)
SPECIFIC GRAVITY UA: 1.01
TOTAL PROTEIN: 6.8 G/DL (ref 6.6–8.7)
TROPONIN: <0.01 NG/ML (ref 0–0.03)
URINE REFLEX TO CULTURE: YES
UROBILINOGEN, URINE: 0.2 E.U./DL
WBC # BLD: 7.9 K/UL (ref 4.8–10.8)
WBC UA: 3 /HPF (ref 0–5)

## 2019-02-07 PROCEDURE — 84484 ASSAY OF TROPONIN QUANT: CPT

## 2019-02-07 PROCEDURE — 99284 EMERGENCY DEPT VISIT MOD MDM: CPT

## 2019-02-07 PROCEDURE — 70450 CT HEAD/BRAIN W/O DYE: CPT

## 2019-02-07 PROCEDURE — 99284 EMERGENCY DEPT VISIT MOD MDM: CPT | Performed by: EMERGENCY MEDICINE

## 2019-02-07 PROCEDURE — 2580000003 HC RX 258: Performed by: EMERGENCY MEDICINE

## 2019-02-07 PROCEDURE — 80053 COMPREHEN METABOLIC PANEL: CPT

## 2019-02-07 PROCEDURE — 71046 X-RAY EXAM CHEST 2 VIEWS: CPT

## 2019-02-07 PROCEDURE — 81001 URINALYSIS AUTO W/SCOPE: CPT

## 2019-02-07 PROCEDURE — 87086 URINE CULTURE/COLONY COUNT: CPT

## 2019-02-07 PROCEDURE — 36415 COLL VENOUS BLD VENIPUNCTURE: CPT

## 2019-02-07 PROCEDURE — 72125 CT NECK SPINE W/O DYE: CPT

## 2019-02-07 PROCEDURE — 93005 ELECTROCARDIOGRAM TRACING: CPT

## 2019-02-07 PROCEDURE — 85025 COMPLETE CBC W/AUTO DIFF WBC: CPT

## 2019-02-07 RX ORDER — 0.9 % SODIUM CHLORIDE 0.9 %
1000 INTRAVENOUS SOLUTION INTRAVENOUS ONCE
Status: COMPLETED | OUTPATIENT
Start: 2019-02-07 | End: 2019-02-07

## 2019-02-07 RX ORDER — AZITHROMYCIN 250 MG/1
TABLET, FILM COATED ORAL
Qty: 1 PACKET | Refills: 0 | Status: SHIPPED | OUTPATIENT
Start: 2019-02-07

## 2019-02-07 RX ORDER — 0.9 % SODIUM CHLORIDE 0.9 %
1000 INTRAVENOUS SOLUTION INTRAVENOUS ONCE
Status: DISCONTINUED | OUTPATIENT
Start: 2019-02-07 | End: 2019-02-07

## 2019-02-07 RX ORDER — BUPIVACAINE HYDROCHLORIDE 5 MG/ML
30 INJECTION, SOLUTION EPIDURAL; INTRACAUDAL ONCE
Status: DISCONTINUED | OUTPATIENT
Start: 2019-02-07 | End: 2019-02-07

## 2019-02-07 RX ADMIN — SODIUM CHLORIDE 1000 ML: 9 INJECTION, SOLUTION INTRAVENOUS at 09:54

## 2019-02-07 ASSESSMENT — ENCOUNTER SYMPTOMS
BACK PAIN: 0
SHORTNESS OF BREATH: 0
ABDOMINAL PAIN: 0
SORE THROAT: 1
COUGH: 1

## 2019-02-07 ASSESSMENT — PAIN DESCRIPTION - LOCATION: LOCATION: HEAD

## 2019-02-07 ASSESSMENT — PAIN SCALES - GENERAL: PAINLEVEL_OUTOF10: 2

## 2019-02-09 LAB — URINE CULTURE, ROUTINE: NORMAL

## 2022-11-02 ENCOUNTER — OFFICE VISIT (OUTPATIENT)
Dept: SURGERY | Facility: CLINIC | Age: 77
End: 2022-11-02

## 2022-11-02 VITALS
TEMPERATURE: 98.4 F | WEIGHT: 107 LBS | HEIGHT: 61 IN | DIASTOLIC BLOOD PRESSURE: 70 MMHG | BODY MASS INDEX: 20.2 KG/M2 | SYSTOLIC BLOOD PRESSURE: 118 MMHG

## 2022-11-02 DIAGNOSIS — Z12.31 SCREENING MAMMOGRAM FOR HIGH-RISK PATIENT: Primary | ICD-10-CM

## 2022-11-02 PROCEDURE — 99213 OFFICE O/P EST LOW 20 MIN: CPT | Performed by: SPECIALIST

## 2022-11-02 NOTE — PATIENT INSTRUCTIONS
Good to see you today Ms. Cruz your mammogram looks great breast exams unremarkable we will see you in 1 year after mammography continue your self breast exams and see me in 1 year.

## 2022-11-02 NOTE — PROGRESS NOTES
Office Established Patient Note:     Referring Provider: Dr. Wren  Chief complaint follow-up breast exam    Subjective .     History of present illness:  Michel Cruz is a 77 y.o. female status post previous excision of a breast abnormality in the inferior lateral right breast.  She has done well from her surgery, she is here for yearly examination.    She is retired , , positive family breast cancer in her mother at age 70, she has had a previous right lateral inferior excisional biopsy, no history of cancer, does have osteoporosis.      History  Past Medical History:   Diagnosis Date   • History of tachycardia    • Hyperlipidemia    • Mitral valve prolapse    • Osteoporosis    ,   Past Surgical History:   Procedure Laterality Date   • BREAST BIOPSY     •  SECTION     • CHOLECYSTECTOMY     • EYE SURGERY     • GANGLION CYST EXCISION      x2   • HYSTERECTOMY     • TONSILLECTOMY     ,   Family History   Problem Relation Age of Onset   • Melanoma Father    • Hypertension Father    • Stroke Father    • Coronary artery disease Father    • Breast cancer Mother    • Hypertension Mother    • Diabetes Mother    • Coronary artery disease Mother    • Stroke Maternal Grandmother    ,   Social History     Tobacco Use   • Smoking status: Never   • Smokeless tobacco: Never   Substance Use Topics   • Alcohol use: No   • Drug use: No   , (Not in a hospital admission)   and Allergies:  Penicillins    Current Outpatient Medications:   •  ALPRAZolam (XANAX) 0.5 MG tablet, Take 0.5 mg by mouth At Night As Needed for anxiety., Disp: , Rfl:   •  aspirin 81 MG tablet, Take 81 mg by mouth Daily., Disp: , Rfl:   •  calcium citrate-vitamin d (CALCITRATE) 315-250 MG-UNIT tablet tablet, Take 600 mg by mouth., Disp: , Rfl:   •  Calcium Citrate-Vitamin D (CALCIUM + D PO), Take 600 mg by mouth Daily., Disp: , Rfl:   •  dorzolamide (TRUSOPT) 2 % ophthalmic solution, Administer 1 drop to both eyes 3 (Three) Times  "a Day., Disp: , Rfl:   •  methylcellulose, Laxative, (CITRUCEL) 500 MG tablet tablet, Take 2 tablets by mouth Every 4 (Four) Hours As Needed., Disp: , Rfl:   •  metoprolol succinate XL (TOPROL-XL) 25 MG 24 hr tablet, , Disp: , Rfl:   •  montelukast (SINGULAIR) 10 MG tablet, Take 10 mg by mouth Every Night., Disp: , Rfl:   •  Multiple Vitamins-Minerals (MULTIVITAMIN ADULT PO), Take 1 tablet by mouth Daily., Disp: , Rfl:   •  Rosuvastatin Calcium (CRESTOR PO), Take 0.5 tablets by mouth Daily., Disp: , Rfl:   •  taflurprost (ZIOPTAN) 0.0015 % solution ophthalmic solution, 1 drop Every Night., Disp: , Rfl:   •  timolol (TIMOPTIC) 0.5 % ophthalmic solution, , Disp: , Rfl:   •  tobramycin 0.3 % solution ophthalmic solution, , Disp: , Rfl:   •  Zioptan 0.0015 % solution ophthalmic solution, , Disp: , Rfl:     Objective     Vital Signs   /70   Temp 98.4 °F (36.9 °C)   Ht 154.9 cm (61\")   Wt 48.5 kg (107 lb)   BMI 20.22 kg/m²      Physical Exam:  Respirations clear and equal    Cardiovascular Trevor regular rate and rhythmPhysical Exam  Chest:               Breast is symmetrical, large, pendulous, well-healed surgical incision in the inferior lateral right breast, no discharge no erythema no adenopathy, no retractions no palpable lesions left or right.  No axillary supraclavicular or infraclavicular abnormalities.    Mammography BI-RADS category I.    Results Review:  Result Review :  No results found for: FINALDX, GROSSDES      Assessment & Plan       Diagnoses and all orders for this visit:    1. Screening mammogram for high-risk patient (Primary)  -     Mammo Screening Bilateral With CAD; Future       Unremarkable breast examination, unremarkable mammography she will be discharged to follow-up with me as needed.    Discussed BMI elevation and need to move toward a reduced BMI for health concerns she appears to understand she is a non smoker and her meds were reviewed.      Tj Nesbitt MD  11/02/22  09:26 " CDT

## 2022-12-05 NOTE — PROGRESS NOTES
Subjective    Ms. Cruz is 77 y.o. female    Chief Complaint: Overactive Bladder    History of Present Illness     77-year-old female new patient referred for overactive bladder and urge incontinence.  Previously prescribed oxybutynin 5 mg however patient started experiencing increased blurry vision and was seen by her ophthalmologist which quickly stopped the oxybutynin due to patient history of glaucoma.  Patient reports she had taken the oxybutynin for approximately 1 month and reported was noticing improvement in her voiding symptoms and incontinence however has had nothing since stopping the oxybutynin.  Reports worse symptoms consist of urine urgency, nocturia x4, and urge incontinence.  Reports a recent diagnosis of colitis for which patient was instructed to increase fluid intake however has been limiting intake past 5 PM and is still up urinating 4 times nightly.    The following portions of the patient's history were reviewed and updated as appropriate: allergies, current medications, past family history, past medical history, past social history, past surgical history and problem list.    Review of Systems   Constitutional: Negative for chills and fever.   Gastrointestinal: Negative for abdominal pain, anal bleeding, blood in stool, nausea and vomiting.   Genitourinary: Positive for frequency and urgency. Negative for decreased urine volume, difficulty urinating, dysuria, flank pain, hematuria, pelvic pain and vaginal pain.         Current Outpatient Medications:   •  ALPRAZolam (XANAX) 0.5 MG tablet, Take 0.5 mg by mouth At Night As Needed for anxiety., Disp: , Rfl:   •  aspirin 81 MG tablet, Take 81 mg by mouth Daily., Disp: , Rfl:   •  calcium citrate-vitamin d (CALCITRATE) 315-250 MG-UNIT tablet tablet, Take 600 mg by mouth., Disp: , Rfl:   •  Calcium Citrate-Vitamin D (CALCIUM + D PO), Take 600 mg by mouth Daily., Disp: , Rfl:   •  dorzolamide (TRUSOPT) 2 % ophthalmic solution, Administer 1 drop to  both eyes 3 (Three) Times a Day., Disp: , Rfl:   •  methylcellulose, Laxative, (CITRUCEL) 500 MG tablet tablet, Take 2 tablets by mouth Every 4 (Four) Hours As Needed., Disp: , Rfl:   •  metoprolol succinate XL (TOPROL-XL) 25 MG 24 hr tablet, , Disp: , Rfl:   •  montelukast (SINGULAIR) 10 MG tablet, Take 10 mg by mouth Every Night., Disp: , Rfl:   •  Multiple Vitamins-Minerals (MULTIVITAMIN ADULT PO), Take 1 tablet by mouth Daily., Disp: , Rfl:   •  rosuvastatin (CRESTOR) 10 MG tablet, , Disp: , Rfl:   •  Rosuvastatin Calcium (CRESTOR PO), Take 0.5 tablets by mouth Daily., Disp: , Rfl:   •  taflurprost (ZIOPTAN) 0.0015 % solution ophthalmic solution, 1 drop Every Night., Disp: , Rfl:   •  timolol (TIMOPTIC) 0.5 % ophthalmic solution, , Disp: , Rfl:   •  tobramycin 0.3 % solution ophthalmic solution, , Disp: , Rfl:   •  Zioptan 0.0015 % solution ophthalmic solution, , Disp: , Rfl:   •  Mirabegron ER (Myrbetriq) 25 MG tablet sustained-release 24 hour 24 hr tablet, Take 1 tablet by mouth Daily., Disp: 30 tablet, Rfl: 11    Past Medical History:   Diagnosis Date   • History of tachycardia    • Hyperlipidemia    • Mitral valve prolapse    • Osteoporosis        Past Surgical History:   Procedure Laterality Date   • BREAST BIOPSY     •  SECTION     • CHOLECYSTECTOMY     • EYE SURGERY     • GANGLION CYST EXCISION      x2   • HYSTERECTOMY     • TONSILLECTOMY         Social History     Socioeconomic History   • Marital status:    Tobacco Use   • Smoking status: Never   • Smokeless tobacco: Never   Substance and Sexual Activity   • Alcohol use: No   • Drug use: No   • Sexual activity: Defer       Family History   Problem Relation Age of Onset   • Melanoma Father    • Hypertension Father    • Stroke Father    • Coronary artery disease Father    • Breast cancer Mother    • Hypertension Mother    • Diabetes Mother    • Coronary artery disease Mother    • Stroke Maternal Grandmother        Objective    Temp 96.8 °F  "(36 °C)   Ht 157.5 cm (62\")   Wt 48.5 kg (107 lb)   BMI 19.57 kg/m²     Physical Exam  Nursing note reviewed.   Constitutional:       General: She is not in acute distress.     Appearance: Normal appearance. She is not ill-appearing.   HENT:      Nose: No congestion.   Abdominal:      Tenderness: There is no right CVA tenderness or left CVA tenderness.      Hernia: No hernia is present.   Skin:     General: Skin is warm and dry.   Neurological:      Mental Status: She is alert and oriented to person, place, and time.   Psychiatric:         Mood and Affect: Mood normal.         Behavior: Behavior normal.             Results for orders placed or performed in visit on 12/12/22   POC Urinalysis Dipstick, Multipro    Specimen: Urine   Result Value Ref Range    Color Yellow Yellow, Straw, Dark Yellow, Chyao    Clarity, UA Clear Clear    Glucose, UA Negative Negative mg/dL    Bilirubin Negative Negative    Ketones, UA Negative Negative    Specific Gravity  1.025 1.005 - 1.030    Blood, UA Negative Negative    pH, Urine 5.5 5.0 - 8.0    Protein, POC Negative Negative mg/dL    Urobilinogen, UA 0.2 E.U./dL Normal, 0.2 E.U./dL    Nitrite, UA Negative Negative    Leukocytes Negative Negative        Estimation of residual urine via abdominal ultrasound  Residual Urine: 78 ml  Indication: Frequency  Position: Supine  Examination: Incremental scanning of the suprapubic area using 3 MHz transducer using copious amounts of acoustic gel.   Findings: An anechoic area was demonstrated which represented the bladder, with measurement of residual urine as noted. I inspected this myself. In that the residual urine was stable or insignificant, no treatment will be necessary at this time.           Assessment and Plan    Diagnoses and all orders for this visit:    1. OAB (overactive bladder) (Primary)  -     POC Urinalysis Dipstick, Multipro  -     Mirabegron ER (Myrbetriq) 25 MG tablet sustained-release 24 hour 24 hr tablet; Take 1 " tablet by mouth Daily.  Dispense: 30 tablet; Refill: 11    2. Urge incontinence      77-year-old female new patient referred for overactive bladder and urge incontinence.     Previously prescribed oxybutynin which was helping patient's LAD symptoms however patient was taken off of oxybutynin by her ophthalmologist as she has a history of glaucoma and started experiencing further blurry vision.    Patient instructed to avoid all anticholinergic medications.    Will start patient on Myrbetriq 25 mg daily    Will have patient follow-up in 3 months

## 2022-12-12 ENCOUNTER — OFFICE VISIT (OUTPATIENT)
Dept: UROLOGY | Facility: CLINIC | Age: 77
End: 2022-12-12

## 2022-12-12 VITALS — BODY MASS INDEX: 19.69 KG/M2 | HEIGHT: 62 IN | TEMPERATURE: 96.8 F | WEIGHT: 107 LBS

## 2022-12-12 DIAGNOSIS — N39.41 URGE INCONTINENCE: ICD-10-CM

## 2022-12-12 DIAGNOSIS — N32.81 OAB (OVERACTIVE BLADDER): Primary | ICD-10-CM

## 2022-12-12 LAB
BILIRUB BLD-MCNC: NEGATIVE MG/DL
CLARITY, POC: CLEAR
COLOR UR: YELLOW
GLUCOSE UR STRIP-MCNC: NEGATIVE MG/DL
KETONES UR QL: NEGATIVE
LEUKOCYTE EST, POC: NEGATIVE
NITRITE UR-MCNC: NEGATIVE MG/ML
PH UR: 5.5 [PH] (ref 5–8)
PROT UR STRIP-MCNC: NEGATIVE MG/DL
RBC # UR STRIP: NEGATIVE /UL
SP GR UR: 1.02 (ref 1–1.03)
UROBILINOGEN UR QL: NORMAL

## 2022-12-12 PROCEDURE — 51798 US URINE CAPACITY MEASURE: CPT

## 2022-12-12 PROCEDURE — 99214 OFFICE O/P EST MOD 30 MIN: CPT

## 2022-12-12 PROCEDURE — 81001 URINALYSIS AUTO W/SCOPE: CPT

## 2022-12-12 RX ORDER — ROSUVASTATIN CALCIUM 10 MG/1
TABLET, COATED ORAL
COMMUNITY
Start: 2022-10-12

## 2023-03-03 NOTE — PROGRESS NOTES
Subjective    Ms. Cruz is 78 y.o. female    Chief Complaint: Nocturia, Urge Incontinence     History of Present Illness    78-year-old female established patient in for 3-month follow-up regarding overactive bladder and urge incontinence.  Patient reports improvement since starting the Myrbetriq 25 mg is still awakening approximately 1-2 times nightly however much improved from the previous 4 times nightly however patient states that she still feels that the urgency could be improved along with nocturia.  Patient denies any full incontinence at present.    Previously tried oxybutynin however experienced increased blurry vision and was quickly stopped as patient has a history of glaucoma.      The following portions of the patient's history were reviewed and updated as appropriate: allergies, current medications, past family history, past medical history, past social history, past surgical history and problem list.    Review of Systems   Constitutional: Negative for chills and fever.   Gastrointestinal: Negative for abdominal pain, anal bleeding, blood in stool, nausea and vomiting.   Genitourinary: Positive for frequency (at night) and urgency. Negative for decreased urine volume, difficulty urinating, dysuria, flank pain, hematuria, pelvic pain and vaginal pain.         Current Outpatient Medications:   •  calcium citrate-vitamin d (CALCITRATE) 315-250 MG-UNIT tablet tablet, Take 600 mg by mouth., Disp: , Rfl:   •  Calcium Citrate-Vitamin D (CALCIUM + D PO), Take 600 mg by mouth Daily., Disp: , Rfl:   •  latanoprost (XALATAN) 0.005 % ophthalmic solution, , Disp: , Rfl:   •  methylcellulose, Laxative, (CITRUCEL) 500 MG tablet tablet, Take 2 tablets by mouth Every 4 (Four) Hours As Needed., Disp: , Rfl:   •  metoprolol succinate XL (TOPROL-XL) 25 MG 24 hr tablet, , Disp: , Rfl:   •  montelukast (SINGULAIR) 10 MG tablet, Take 1 tablet by mouth Every Night., Disp: , Rfl:   •  Multiple Vitamins-Minerals (MULTIVITAMIN  "ADULT PO), Take 1 tablet by mouth Daily., Disp: , Rfl:   •  rosuvastatin (CRESTOR) 10 MG tablet, , Disp: , Rfl:   •  timolol (TIMOPTIC) 0.5 % ophthalmic solution, , Disp: , Rfl:   •  Mirabegron ER (Myrbetriq) 50 MG tablet sustained-release 24 hour 24 hr tablet, Take 50 mg by mouth Daily., Disp: 30 tablet, Rfl: 11    Past Medical History:   Diagnosis Date   • History of tachycardia    • Hyperlipidemia    • Mitral valve prolapse    • Osteoporosis    • Urinary incontinence     Have trouble holding when need to empty bladder       Past Surgical History:   Procedure Laterality Date   • BREAST BIOPSY     •  SECTION     • CHOLECYSTECTOMY     • EYE SURGERY     • GANGLION CYST EXCISION      x2   • HYSTERECTOMY     • TONSILLECTOMY         Social History     Socioeconomic History   • Marital status:    Tobacco Use   • Smoking status: Never   • Smokeless tobacco: Never   Substance and Sexual Activity   • Alcohol use: No   • Drug use: No   • Sexual activity: Not Currently     Partners: Male     Birth control/protection: Hysterectomy       Family History   Problem Relation Age of Onset   • Melanoma Father    • Hypertension Father    • Stroke Father    • Coronary artery disease Father    • Heart disease Father    • Breast cancer Mother    • Hypertension Mother    • Diabetes Mother    • Coronary artery disease Mother    • Cancer Mother         Breast cancer. Both breasts   • Heart disease Mother    • Stroke Maternal Grandmother        Objective    Temp 96.6 °F (35.9 °C)   Ht 157.5 cm (62\")   Wt 49 kg (108 lb)   BMI 19.75 kg/m²     Physical Exam  Nursing note reviewed.   Constitutional:       General: She is not in acute distress.     Appearance: Normal appearance. She is not ill-appearing.   HENT:      Nose: No congestion.   Abdominal:      Tenderness: There is no right CVA tenderness or left CVA tenderness.      Hernia: No hernia is present.   Skin:     General: Skin is warm and dry.   Neurological:      " Mental Status: She is alert and oriented to person, place, and time.   Psychiatric:         Mood and Affect: Mood normal.         Behavior: Behavior normal.             Results for orders placed or performed in visit on 03/13/23   POC Urinalysis Dipstick, Multipro    Specimen: Urine   Result Value Ref Range    Color Yellow Yellow, Straw, Dark Yellow, Chayo    Clarity, UA Clear Clear    Glucose, UA Negative Negative mg/dL    Bilirubin Negative Negative    Ketones, UA Negative Negative    Specific Gravity  1.030 1.005 - 1.030    Blood, UA Negative Negative    pH, Urine 5.0 5.0 - 8.0    Protein, POC Negative Negative mg/dL    Urobilinogen, UA 0.2 E.U./dL Normal, 0.2 E.U./dL    Nitrite, UA Negative Negative    Leukocytes Negative Negative     Assessment and Plan    Diagnoses and all orders for this visit:    1. OAB (overactive bladder) (Primary)  -     POC Urinalysis Dipstick, Multipro  -     Mirabegron ER (Myrbetriq) 50 MG tablet sustained-release 24 hour 24 hr tablet; Take 50 mg by mouth Daily.  Dispense: 30 tablet; Refill: 11    2. Urge incontinence  -     Mirabegron ER (Myrbetriq) 50 MG tablet sustained-release 24 hour 24 hr tablet; Take 50 mg by mouth Daily.  Dispense: 30 tablet; Refill: 11    78-year-old female established patient in for 3-month follow-up regarding overactive bladder and urge incontinence    Improvement in nocturia since starting Myrbetriq 25 mg daily is now up 1-2 times nightly instead of 4 times nightly however is still experiencing more urgency and nocturia than would like    Previously unable to tolerate oxybutynin due to increased blurry vision as patient has a history of glaucoma     Patient instructed to avoid all anticholinergic medications.     Will increase Myrbetriq to 50 mg daily-we will provide patient with a 1 month sample pack and will send in a new prescription     Will have patient follow-up in 3 months

## 2023-03-13 ENCOUNTER — OFFICE VISIT (OUTPATIENT)
Dept: UROLOGY | Facility: CLINIC | Age: 78
End: 2023-03-13
Payer: MEDICARE

## 2023-03-13 VITALS — WEIGHT: 108 LBS | TEMPERATURE: 96.6 F | HEIGHT: 62 IN | BODY MASS INDEX: 19.88 KG/M2

## 2023-03-13 DIAGNOSIS — N32.81 OAB (OVERACTIVE BLADDER): Primary | ICD-10-CM

## 2023-03-13 DIAGNOSIS — N39.41 URGE INCONTINENCE: ICD-10-CM

## 2023-03-13 LAB
BILIRUB BLD-MCNC: NEGATIVE MG/DL
CLARITY, POC: CLEAR
COLOR UR: YELLOW
GLUCOSE UR STRIP-MCNC: NEGATIVE MG/DL
KETONES UR QL: NEGATIVE
LEUKOCYTE EST, POC: NEGATIVE
NITRITE UR-MCNC: NEGATIVE MG/ML
PH UR: 5 [PH] (ref 5–8)
PROT UR STRIP-MCNC: NEGATIVE MG/DL
RBC # UR STRIP: NEGATIVE /UL
SP GR UR: 1.03 (ref 1–1.03)
UROBILINOGEN UR QL: NORMAL

## 2023-03-13 PROCEDURE — 99213 OFFICE O/P EST LOW 20 MIN: CPT

## 2023-03-13 PROCEDURE — 1160F RVW MEDS BY RX/DR IN RCRD: CPT

## 2023-03-13 PROCEDURE — 81001 URINALYSIS AUTO W/SCOPE: CPT

## 2023-03-13 PROCEDURE — 1159F MED LIST DOCD IN RCRD: CPT

## 2023-03-13 RX ORDER — LATANOPROST 50 UG/ML
SOLUTION/ DROPS OPHTHALMIC
COMMUNITY
Start: 2023-03-01

## 2023-05-31 ENCOUNTER — HOSPITAL ENCOUNTER (OUTPATIENT)
Facility: HOSPITAL | Age: 78
Setting detail: OBSERVATION
Discharge: HOME | End: 2023-06-02
Attending: FAMILY MEDICINE | Admitting: INTERNAL MEDICINE
Payer: MEDICARE

## 2023-05-31 ENCOUNTER — APPOINTMENT (OUTPATIENT)
Dept: CT IMAGING | Facility: HOSPITAL | Age: 78
End: 2023-05-31
Payer: MEDICARE

## 2023-05-31 ENCOUNTER — APPOINTMENT (OUTPATIENT)
Dept: GENERAL RADIOLOGY | Facility: HOSPITAL | Age: 78
End: 2023-05-31
Payer: MEDICARE

## 2023-05-31 DIAGNOSIS — Z74.09 IMPAIRED MOBILITY: ICD-10-CM

## 2023-05-31 DIAGNOSIS — S32.592A CLOSED FRACTURE OF MULTIPLE PUBIC RAMI, LEFT, INITIAL ENCOUNTER: Primary | ICD-10-CM
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
## 2023-05-31 PROBLEM — I10 ESSENTIAL HYPERTENSION: Status: ACTIVE | Noted: 2023-05-31

## 2023-05-31 PROBLEM — G89.11 ACUTE PAIN DUE TO INJURY: Status: ACTIVE | Noted: 2023-05-31

## 2023-05-31 LAB
ALBUMIN SERPL-MCNC: 4.2 G/DL (ref 3.5–5.2)
ALBUMIN/GLOB SERPL: 1.4 G/DL
ALP SERPL-CCNC: 87 U/L (ref 39–117)
ALT SERPL W P-5'-P-CCNC: 27 U/L (ref 1–33)
ANION GAP SERPL CALCULATED.3IONS-SCNC: 11 MMOL/L (ref 5–15)
AST SERPL-CCNC: 32 U/L (ref 1–32)
BASOPHILS # BLD AUTO: 0.02 10*3/MM3 (ref 0–0.2)
BASOPHILS NFR BLD AUTO: 0.1 % (ref 0–1.5)
BILIRUB SERPL-MCNC: 0.5 MG/DL (ref 0–1.2)
BILIRUB UR QL STRIP: NEGATIVE
BUN SERPL-MCNC: 21 MG/DL (ref 8–23)
BUN/CREAT SERPL: 34.4 (ref 7–25)
CALCIUM SPEC-SCNC: 9.6 MG/DL (ref 8.6–10.5)
CHLORIDE SERPL-SCNC: 103 MMOL/L (ref 98–107)
CLARITY UR: CLEAR
CO2 SERPL-SCNC: 26 MMOL/L (ref 22–29)
COLOR UR: YELLOW
CREAT SERPL-MCNC: 0.61 MG/DL (ref 0.57–1)
DEPRECATED RDW RBC AUTO: 40.8 FL (ref 37–54)
EGFRCR SERPLBLD CKD-EPI 2021: 91.6 ML/MIN/1.73
EOSINOPHIL # BLD AUTO: 0.04 10*3/MM3 (ref 0–0.4)
EOSINOPHIL NFR BLD AUTO: 0.3 % (ref 0.3–6.2)
ERYTHROCYTE [DISTWIDTH] IN BLOOD BY AUTOMATED COUNT: 12.2 % (ref 12.3–15.4)
GLOBULIN UR ELPH-MCNC: 3 GM/DL
GLUCOSE SERPL-MCNC: 128 MG/DL (ref 65–99)
GLUCOSE UR STRIP-MCNC: NEGATIVE MG/DL
HCT VFR BLD AUTO: 40.9 % (ref 34–46.6)
HGB BLD-MCNC: 13.1 G/DL (ref 12–15.9)
HGB UR QL STRIP.AUTO: NEGATIVE
IMM GRANULOCYTES # BLD AUTO: 0.09 10*3/MM3 (ref 0–0.05)
IMM GRANULOCYTES NFR BLD AUTO: 0.6 % (ref 0–0.5)
KETONES UR QL STRIP: NEGATIVE
LEUKOCYTE ESTERASE UR QL STRIP.AUTO: NEGATIVE
LYMPHOCYTES # BLD AUTO: 1.32 10*3/MM3 (ref 0.7–3.1)
LYMPHOCYTES NFR BLD AUTO: 9.2 % (ref 19.6–45.3)
MCH RBC QN AUTO: 28.9 PG (ref 26.6–33)
MCHC RBC AUTO-ENTMCNC: 32 G/DL (ref 31.5–35.7)
MCV RBC AUTO: 90.1 FL (ref 79–97)
MONOCYTES # BLD AUTO: 0.74 10*3/MM3 (ref 0.1–0.9)
MONOCYTES NFR BLD AUTO: 5.1 % (ref 5–12)
NEUTROPHILS NFR BLD AUTO: 12.17 10*3/MM3 (ref 1.7–7)
NEUTROPHILS NFR BLD AUTO: 84.7 % (ref 42.7–76)
NITRITE UR QL STRIP: NEGATIVE
NRBC BLD AUTO-RTO: 0 /100 WBC (ref 0–0.2)
PH UR STRIP.AUTO: 7.5 [PH] (ref 5–8)
PLATELET # BLD AUTO: 148 10*3/MM3 (ref 140–450)
PMV BLD AUTO: 12.2 FL (ref 6–12)
POTASSIUM SERPL-SCNC: 4.2 MMOL/L (ref 3.5–5.2)
PROT SERPL-MCNC: 7.2 G/DL (ref 6–8.5)
PROT UR QL STRIP: NEGATIVE
RBC # BLD AUTO: 4.54 10*6/MM3 (ref 3.77–5.28)
SODIUM SERPL-SCNC: 140 MMOL/L (ref 136–145)
SP GR UR STRIP: 1.01 (ref 1–1.03)
UROBILINOGEN UR QL STRIP: NORMAL
WBC NRBC COR # BLD: 14.38 10*3/MM3 (ref 3.4–10.8)

## 2023-05-31 PROCEDURE — 80053 COMPREHEN METABOLIC PANEL: CPT | Performed by: NURSE PRACTITIONER

## 2023-05-31 PROCEDURE — 73502 X-RAY EXAM HIP UNI 2-3 VIEWS: CPT

## 2023-05-31 PROCEDURE — 99284 EMERGENCY DEPT VISIT MOD MDM: CPT

## 2023-05-31 PROCEDURE — 0 HYDROMORPHONE 1 MG/ML SOLUTION: Performed by: INTERNAL MEDICINE

## 2023-05-31 PROCEDURE — 72125 CT NECK SPINE W/O DYE: CPT

## 2023-05-31 PROCEDURE — 81003 URINALYSIS AUTO W/O SCOPE: CPT | Performed by: NURSE PRACTITIONER

## 2023-05-31 PROCEDURE — 96374 THER/PROPH/DIAG INJ IV PUSH: CPT

## 2023-05-31 PROCEDURE — 85025 COMPLETE CBC W/AUTO DIFF WBC: CPT | Performed by: NURSE PRACTITIONER

## 2023-05-31 PROCEDURE — 94664 DEMO&/EVAL PT USE INHALER: CPT

## 2023-05-31 PROCEDURE — G0378 HOSPITAL OBSERVATION PER HR: HCPCS

## 2023-05-31 PROCEDURE — 70450 CT HEAD/BRAIN W/O DYE: CPT

## 2023-05-31 PROCEDURE — 73030 X-RAY EXAM OF SHOULDER: CPT

## 2023-05-31 RX ORDER — ACETAMINOPHEN 160 MG/5ML
650 SOLUTION ORAL EVERY 4 HOURS PRN
Status: DISCONTINUED | OUTPATIENT
Start: 2023-05-31 | End: 2023-06-02 | Stop reason: HOSPADM

## 2023-05-31 RX ORDER — LATANOPROST 50 UG/ML
1 SOLUTION/ DROPS OPHTHALMIC NIGHTLY
Status: DISCONTINUED | OUTPATIENT
Start: 2023-05-31 | End: 2023-06-02 | Stop reason: HOSPADM

## 2023-05-31 RX ORDER — OXYCODONE AND ACETAMINOPHEN 7.5; 325 MG/1; MG/1
1 TABLET ORAL EVERY 4 HOURS PRN
Status: DISCONTINUED | OUTPATIENT
Start: 2023-05-31 | End: 2023-06-02 | Stop reason: HOSPADM

## 2023-05-31 RX ORDER — ACETAMINOPHEN 650 MG/1
650 SUPPOSITORY RECTAL EVERY 4 HOURS PRN
Status: DISCONTINUED | OUTPATIENT
Start: 2023-05-31 | End: 2023-06-02 | Stop reason: HOSPADM

## 2023-05-31 RX ORDER — METOPROLOL SUCCINATE 25 MG/1
25 TABLET, EXTENDED RELEASE ORAL
Status: DISCONTINUED | OUTPATIENT
Start: 2023-05-31 | End: 2023-06-02 | Stop reason: HOSPADM

## 2023-05-31 RX ORDER — AMOXICILLIN 250 MG
2 CAPSULE ORAL 2 TIMES DAILY
Status: DISCONTINUED | OUTPATIENT
Start: 2023-05-31 | End: 2023-05-31

## 2023-05-31 RX ORDER — SODIUM CHLORIDE 0.9 % (FLUSH) 0.9 %
10 SYRINGE (ML) INJECTION AS NEEDED
Status: DISCONTINUED | OUTPATIENT
Start: 2023-05-31 | End: 2023-06-02 | Stop reason: HOSPADM

## 2023-05-31 RX ORDER — SODIUM CHLORIDE 9 MG/ML
100 INJECTION, SOLUTION INTRAVENOUS CONTINUOUS
Status: DISCONTINUED | OUTPATIENT
Start: 2023-05-31 | End: 2023-06-02

## 2023-05-31 RX ORDER — HYDROMORPHONE HYDROCHLORIDE 1 MG/ML
0.5 INJECTION, SOLUTION INTRAMUSCULAR; INTRAVENOUS; SUBCUTANEOUS
Status: DISCONTINUED | OUTPATIENT
Start: 2023-05-31 | End: 2023-06-02 | Stop reason: HOSPADM

## 2023-05-31 RX ORDER — TIMOLOL MALEATE 5 MG/ML
1 SOLUTION/ DROPS OPHTHALMIC EVERY 12 HOURS SCHEDULED
Status: DISCONTINUED | OUTPATIENT
Start: 2023-05-31 | End: 2023-06-02 | Stop reason: HOSPADM

## 2023-05-31 RX ORDER — BISACODYL 5 MG/1
5 TABLET, DELAYED RELEASE ORAL DAILY PRN
Status: DISCONTINUED | OUTPATIENT
Start: 2023-05-31 | End: 2023-06-02 | Stop reason: HOSPADM

## 2023-05-31 RX ORDER — ONDANSETRON 2 MG/ML
4 INJECTION INTRAMUSCULAR; INTRAVENOUS EVERY 6 HOURS PRN
Status: DISCONTINUED | OUTPATIENT
Start: 2023-05-31 | End: 2023-06-02 | Stop reason: HOSPADM

## 2023-05-31 RX ORDER — BISACODYL 10 MG
10 SUPPOSITORY, RECTAL RECTAL DAILY PRN
Status: DISCONTINUED | OUTPATIENT
Start: 2023-05-31 | End: 2023-06-02 | Stop reason: HOSPADM

## 2023-05-31 RX ORDER — ONDANSETRON 4 MG/1
4 TABLET, FILM COATED ORAL EVERY 6 HOURS PRN
Status: DISCONTINUED | OUTPATIENT
Start: 2023-05-31 | End: 2023-06-02 | Stop reason: HOSPADM

## 2023-05-31 RX ORDER — ACETAMINOPHEN 325 MG/1
650 TABLET ORAL EVERY 4 HOURS PRN
Status: DISCONTINUED | OUTPATIENT
Start: 2023-05-31 | End: 2023-06-02 | Stop reason: HOSPADM

## 2023-05-31 RX ORDER — POLYETHYLENE GLYCOL 3350 17 G/17G
17 POWDER, FOR SOLUTION ORAL DAILY PRN
Status: DISCONTINUED | OUTPATIENT
Start: 2023-05-31 | End: 2023-06-02 | Stop reason: HOSPADM

## 2023-05-31 RX ORDER — NALOXONE HCL 0.4 MG/ML
0.4 VIAL (ML) INJECTION
Status: DISCONTINUED | OUTPATIENT
Start: 2023-05-31 | End: 2023-06-02 | Stop reason: HOSPADM

## 2023-05-31 RX ORDER — MONTELUKAST SODIUM 10 MG/1
10 TABLET ORAL NIGHTLY
Status: DISCONTINUED | OUTPATIENT
Start: 2023-05-31 | End: 2023-06-01

## 2023-05-31 RX ORDER — OXYCODONE AND ACETAMINOPHEN 10; 325 MG/1; MG/1
1 TABLET ORAL EVERY 4 HOURS PRN
Status: DISCONTINUED | OUTPATIENT
Start: 2023-05-31 | End: 2023-06-02 | Stop reason: HOSPADM

## 2023-05-31 RX ORDER — SODIUM CHLORIDE 0.9 % (FLUSH) 0.9 %
10 SYRINGE (ML) INJECTION EVERY 12 HOURS SCHEDULED
Status: DISCONTINUED | OUTPATIENT
Start: 2023-05-31 | End: 2023-06-02 | Stop reason: HOSPADM

## 2023-05-31 RX ORDER — SODIUM CHLORIDE 9 MG/ML
40 INJECTION, SOLUTION INTRAVENOUS AS NEEDED
Status: DISCONTINUED | OUTPATIENT
Start: 2023-05-31 | End: 2023-06-02 | Stop reason: HOSPADM

## 2023-05-31 RX ADMIN — SODIUM CHLORIDE 50 ML/HR: 9 INJECTION, SOLUTION INTRAVENOUS at 20:31

## 2023-05-31 RX ADMIN — HYDROMORPHONE HYDROCHLORIDE 1 MG: 1 INJECTION, SOLUTION INTRAMUSCULAR; INTRAVENOUS; SUBCUTANEOUS at 18:14

## 2023-05-31 RX ADMIN — METOPROLOL SUCCINATE 25 MG: 25 TABLET, EXTENDED RELEASE ORAL at 20:43

## 2023-05-31 RX ADMIN — Medication 10 ML: at 20:32

## 2023-05-31 NOTE — H&P
[facility] Services  HISTORY AND PHYSICAL    Date of Admission: 2023  Primary Care Physician: Isrrael Wren MD    Subjective   Primary Historian: Patient    Chief Complaint: Fall with left hip and pelvic pain    History of Present Illness  Michel Cruz is a 78-year-old female with past medical history of essential hypertension tension, arrhythmia, hyperlipidemia, osteoporosis, please see below for complete list.  Patient presented to [facility] emergency department after a fall while trying to reach salt from upper cabinet.  She states she does not believe she passed out or tripped.  She states I simply fell backwards.  She landed on her left hip shoulder and did hit her head.  Work-up thus far has been negative for shoulder or head injury.  X-ray of the pelvis revealed mildly displaced acute left superior and inferior rami fractures.  Currently she is scoring her pain 10 on a scale of 1-10.  She was unable to get up without acute pain while at home and since her arrival.  Orthopedics consulted, no surgical intervention.  Patient is admitted for further evaluation treatment.    Review of Systems   Otherwise complete ROS reviewed and negative except as mentioned in the HPI.    Past Medical History:   Past Medical History:   Diagnosis Date   • Essential hypertension 2023   • History of tachycardia    • Hyperlipidemia    • Mitral valve prolapse    • Osteoporosis    • Urinary incontinence     Have trouble holding when need to empty bladder     Past Surgical History:  Past Surgical History:   Procedure Laterality Date   • BREAST BIOPSY     •  SECTION     • CHOLECYSTECTOMY     • EYE SURGERY     • GANGLION CYST EXCISION      x2   • HYSTERECTOMY     • TONSILLECTOMY       Social History:  reports that she has never smoked. She has never used smokeless tobacco. She reports that she does not drink alcohol and does not use drugs.    Family  History: family history includes Breast cancer in her mother; Cancer in her mother; Coronary artery disease in her father and mother; Diabetes in her mother; Heart disease in her father and mother; Hypertension in her father and mother; Melanoma in her father; Stroke in her father and maternal grandmother.       Allergies:  Allergies   Allergen Reactions   • Penicillins Rash       Medications:  Prior to Admission medications    Medication Sig Start Date End Date Taking? Authorizing Provider   calcium citrate-vitamin d (CALCITRATE) 315-250 MG-UNIT tablet tablet Take 600 mg by mouth.    Emergency, Nurse TINY Garcia   Calcium Citrate-Vitamin D (CALCIUM + D PO) Take 600 mg by mouth Daily.    Smita Ibanez MD   latanoprost (XALATAN) 0.005 % ophthalmic solution  3/1/23   Smita Ibanez MD   methylcellulose, Laxative, (CITRUCEL) 500 MG tablet tablet Take 2 tablets by mouth Every 4 (Four) Hours As Needed.    Smita Ibanez MD   metoprolol succinate XL (TOPROL-XL) 25 MG 24 hr tablet  12/2/16   Smita Ibanez MD   Mirabegron ER (Myrbetriq) 50 MG tablet sustained-release 24 hour 24 hr tablet Take 50 mg by mouth Daily. 3/13/23   Maye Bronson APRN   montelukast (SINGULAIR) 10 MG tablet Take 1 tablet by mouth Every Night.    Smita Ibanez MD   Multiple Vitamins-Minerals (MULTIVITAMIN ADULT PO) Take 1 tablet by mouth Daily.    Smita Ibanez MD   timolol (TIMOPTIC) 0.5 % ophthalmic solution  9/13/21   Emergency, Nurse Radha RN   rosuvastatin (CRESTOR) 10 MG tablet  10/12/22 5/31/23  Smita Ibanez MD     I have utilized all available immediate resources to obtain, update, or review the patient's current medications (including all prescriptions, over-the-counter products, herbals, cannabis/cannabidiol products, and vitamin/mineral/dietary (nutritional) supplements).    Objective     Vital Signs: /54 (BP Location: Left arm, Patient Position: Lying)   Pulse 83   Temp  "98.6 °F (37 °C) (Oral)   Resp 18   Ht 154.9 cm (61\")   Wt 49.4 kg (109 lb)   SpO2 95%   BMI 20.60 kg/m²   Physical Exam  Vitals reviewed.   Constitutional:       Appearance: Normal appearance.   HENT:      Head: Normocephalic and atraumatic.      Mouth/Throat:      Mouth: Mucous membranes are moist.      Pharynx: Oropharynx is clear.   Eyes:      Extraocular Movements: Extraocular movements intact.      Conjunctiva/sclera: Conjunctivae normal.   Cardiovascular:      Rate and Rhythm: Normal rate and regular rhythm.   Pulmonary:      Effort: Pulmonary effort is normal.      Breath sounds: Normal breath sounds.   Abdominal:      General: There is no distension.      Palpations: Abdomen is soft.   Musculoskeletal:      Cervical back: Normal range of motion and neck supple.      Right lower leg: No edema.      Left lower leg: No edema.      Comments: Pain with movement lower extremity   Skin:     General: Skin is warm and dry.   Neurological:      General: No focal deficit present.      Mental Status: She is alert and oriented to person, place, and time.   Psychiatric:         Mood and Affect: Mood normal.         Behavior: Behavior normal.        Results Reviewed:  Lab Results (last 24 hours)       ** No results found for the last 24 hours. **          Imaging Results (Last 24 Hours)       Procedure Component Value Units Date/Time    XR Hip With or Without Pelvis 2 - 3 View Left [[id number]] Collected: 05/31/23 1641     Updated: 05/31/23 1647    Narrative:      HISTORY: Injury after fall with left hip pain     Left hip: Frontal view the pelvis as well as frontal and crosstable left  views left hip are obtained.     There are fractures of the left superior ramus adjacent the pubic bone  and of the left inferior ramus with mild displacement. There is moderate  bilateral hip joint space narrowing. There is mild to moderate bony  spurring of the left femoral head at the junction with the femoral neck.  No proximal left " femur fracture. SI joints remain intact.       Impression:      1. Mildly displaced acute left superior and inferior rami fractures.  2. Moderate left and mild arthritic changes of the hips. No proximal  femur fracture identified.  This report was finalized on 05/31/2023 16:44 by Dr. Lana Hayes MD.    XR Shoulder 2+ View Left [[id number]] Collected: 05/31/23 1638     Updated: 05/31/23 1642    Narrative:      HISTORY: Injury with fall     Left shoulder: 3 views of left shoulder are obtained.     Osteopenia. No fracture or dislocation. Mild glenohumeral joint space  narrowing. Visible left-sided ribs intact. No left apical pneumothorax.       Impression:      1. No acute osseous injury left shoulder.  This report was finalized on 05/31/2023 16:39 by Dr. Lana Hayes MD.    CT Cervical Spine Without Contrast [[id number]] Collected: 05/31/23 1603     Updated: 05/31/23 1611    Narrative:      CT CERVICAL SPINE WO CONTRAST- 5/31/2023 3:44 PM CDT     HISTORY: Injury/fall; fall at home after losing balance.     COMPARISON: None     DOSE LENGTH PRODUCT: 265 mGy cm. Automated exposure control was also  utilized to decrease patient radiation dose.     TECHNIQUE: Axial images of cervical spine obtained with sagittal coronal  reconstructions     FINDINGS:  Minimal retrolisthesis of C5 by less than 1 mm. Mild to  moderate diffuse degenerative disc change with moderate facet  arthropathy appearing greatest on the left at the C3/C4 level. Uncinate  process hypertrophy from C5 through C7. 5 mm sclerotic focus of the  right C1 lateral mass favoring incidental bony island.     No cervical vertebral fracture or traumatic malalignment identified.     Degenerative changes creating no significant central canal stenosis.  There is scattered bilateral foraminal narrowing appearing greatest on  the left at C5/C6, moderate in severity.     Mild carotid artery calcification. No apical pneumothorax.       Impression:      1. Mild to  moderate degenerative disc change with moderate facet  arthropathy and uncinate process hypertrophy. No acute cervical  vertebral fracture or traumatic malalignment. No prominent central  cervical canal stenosis with scattered neural foramen narrowing,  greatest of the left at C5/C6.  This report was finalized on 05/31/2023 16:08 by Dr. Lana Hayes MD.    CT Head Without Contrast [[id number]] Collected: 05/31/23 1600     Updated: 05/31/23 1605    Narrative:      EXAMINATION: CT HEAD WO CONTRAST- 5/31/2023 4:00 PM CDT     HISTORY: Fall, head injury. Headache.     DOSE: 523 mGycm (Automatic exposure control technique was implemented in  an effort to keep the radiation dose as low as possible without  compromising image quality)     REPORT: Axial CT of the head was performed without contrast,  reconstructed coronal and sagittal images are also reviewed.     Comparison: There are no correlative imaging studies for comparison.     No intracranial hemorrhage, mass or mass effect is identified. The  ventricles and basal cisterns are within normal limits. There is  slightly decreased attenuation in the periventricular white matter  tracts compatible with chronic small vessel white matter ischemic  disease. The extracranial structures appear within normal limits. No  fracture is identified. There is normal aeration of the visualized  paranasal sinuses and mastoid air cells.       Impression:      No acute intracranial abnormality.   This report was finalized on 05/31/2023 16:02 by Dr. Smith Suero MD.          Assessment / Plan   Assessment:   Active Hospital Problems    Diagnosis    • **Closed fracture of multiple pubic rami, left, initial encounter    • Acute pain due to injury    • Essential hypertension    • Fall with injury        Treatment Plan  1.  The patient will be admitted to Dr. Quiroga's service here at [facility].   2.  Pain and nausea management  3.  Home medications reviewed and restarted as  appropriate  4.  DVT prophylaxis with SCDs  5.  Normal saline 50 mL/hour  6.  Bowel regimen ordered  7.  Supplemental oxygen as needed, continuous pulse oximetry, incentive spirometry  8.  Labs in a.m., awaiting stat lab results  9.  PT and OT consults in a.m.  10.  Orthopedic services contacted by emergency department, no surgical intervention, consulted for continuity of care    Medical Decision Making  Number and Complexity of problems: 4  Differential Diagnosis: None    Conditions and Status        Condition is unchanged.     MDM Data  External documents reviewed: None  Radiology interpretation: Reviewed  Labs reviewed: Pending  Any tests that were considered but not ordered: None     Decision rules/scores evaluated (example [id number], Wells, etc): None     Discussed with: Patient,  and Dr. Quiroga     Care Planning  Shared decision making: Patient,  and Dr. Quiroga  Code status and discussions: Full    Disposition  Social Determinants of Health that impact treatment or disposition: None  Estimated length of stay is 1-2 days.     I confirmed that the patient's advanced care plan is present, code status is documented, and a surrogate decision maker is listed in the patient's medical record.     The patient's surrogate decision maker is darinel Stern.     The patient was seen and examined by me on 5/31/2023 at 5:56 PM.    Electronically signed by JOANNE Landry, 05/31/23, 18:21 CDT.    This visit was performed by both a physician and an APC. I personally evaluated and examined the patient. I performed all aspects of the MDM as documented.    Seen and discussed with her .    She just got to room 337 from the ER.    Still having some discomfort.  She has orders for varying dosages of Percocet and IV hydromorphone.    Discussed with she and her  that these fractures will be nonoperative, but we will still have orthopedics comment.  She will need therapy after pain control.   Already takes vitamin and calcium D supplementation.  Previously on Prolia, but had to stop it secondary to some side effects that she could not describe very well.    Electronically signed by Wilmar Quiroga DO, 5/31/2023, 19:00 CDT.

## 2023-05-31 NOTE — ED PROVIDER NOTES
Subjective   History of Present Illness  This is a 70-year-old female who was reaching up in a cabinet to get some salt.  Patient states that she slipped and fell.  Patient states that she landed on her left shoulder.  Patient states that she also landed on her left hip.  Patient states that she also did hit her head on the discharge.  Patient also consciousness.  Patient has no headache or dizziness.  Patient denies any other symptoms besides the pain in her left hip at this time.  Patient states that she was unable to walk due to the pain in the hip and had to crawl to call her daughter to come here to the emergency room for evaluation.    Review of Systems   All other systems reviewed and are negative.    Past Medical History:   Diagnosis Date    Essential hypertension 2023    History of tachycardia     Hyperlipidemia     Mitral valve prolapse     Osteoporosis     Urinary incontinence     Have trouble holding when need to empty bladder       Allergies   Allergen Reactions    Penicillins Rash       Past Surgical History:   Procedure Laterality Date    BREAST BIOPSY       SECTION      CHOLECYSTECTOMY      EYE SURGERY      GANGLION CYST EXCISION      x2    HYSTERECTOMY      TONSILLECTOMY         Family History   Problem Relation Age of Onset    Melanoma Father     Hypertension Father     Stroke Father     Coronary artery disease Father     Heart disease Father     Breast cancer Mother     Hypertension Mother     Diabetes Mother     Coronary artery disease Mother     Cancer Mother         Breast cancer. Both breasts    Heart disease Mother     Stroke Maternal Grandmother        Social History     Socioeconomic History    Marital status:    Tobacco Use    Smoking status: Never    Smokeless tobacco: Never   Substance and Sexual Activity    Alcohol use: No    Drug use: No    Sexual activity: Not Currently     Partners: Male     Birth control/protection: Hysterectomy           Objective   Physical  Exam  Vitals and nursing note reviewed.   Constitutional:       Appearance: Normal appearance.   HENT:      Head: Normocephalic and atraumatic.   Eyes:      Extraocular Movements: Extraocular movements intact.      Pupils: Pupils are equal, round, and reactive to light.   Cardiovascular:      Rate and Rhythm: Normal rate and regular rhythm.      Heart sounds: Normal heart sounds.   Pulmonary:      Effort: Pulmonary effort is normal.      Breath sounds: Normal breath sounds.   Musculoskeletal:      Left shoulder: Tenderness present. No swelling or deformity. Normal range of motion.      Cervical back: Normal range of motion and neck supple. No tenderness.      Left hip: Tenderness present. Decreased range of motion.   Skin:     General: Skin is warm and dry.   Neurological:      General: No focal deficit present.      Mental Status: She is alert and oriented to person, place, and time.   Psychiatric:         Mood and Affect: Mood normal.         Behavior: Behavior normal.       Procedures           ED Course                                         XR Shoulder 2+ View Left   Final Result   1. No acute osseous injury left shoulder.   This report was finalized on 05/31/2023 16:39 by Dr. Lana Hayes MD.      XR Hip With or Without Pelvis 2 - 3 View Left   Final Result   1. Mildly displaced acute left superior and inferior rami fractures.   2. Moderate left and mild arthritic changes of the hips. No proximal   femur fracture identified.   This report was finalized on 05/31/2023 16:44 by Dr. Lana Hayes MD.      CT Head Without Contrast   Final Result   No acute intracranial abnormality.    This report was finalized on 05/31/2023 16:02 by Dr. Smith Suero MD.      CT Cervical Spine Without Contrast   Final Result   1. Mild to moderate degenerative disc change with moderate facet   arthropathy and uncinate process hypertrophy. No acute cervical   vertebral fracture or traumatic malalignment. No prominent central    cervical canal stenosis with scattered neural foramen narrowing,   greatest of the left at C5/C6.   This report was finalized on 05/31/2023 16:08 by Dr. Lana Hayes MD.          Medications   sodium chloride 0.9 % flush 10 mL (has no administration in time range)   morphine injection 4 mg (0 mg Intravenous Hold 5/31/23 1806)   latanoprost (XALATAN) 0.005 % ophthalmic solution 1 drop (has no administration in time range)   methylcellulose (Laxative) (CITRUCEL) tablet 1,000 mg (has no administration in time range)   metoprolol succinate XL (TOPROL-XL) 24 hr tablet 25 mg (has no administration in time range)   montelukast (SINGULAIR) tablet 10 mg (has no administration in time range)   timolol (TIMOPTIC) 0.5 % ophthalmic solution 1 drop (has no administration in time range)   sodium chloride 0.9 % flush 10 mL (has no administration in time range)   sodium chloride 0.9 % flush 10 mL (has no administration in time range)   sodium chloride 0.9 % infusion 40 mL (has no administration in time range)   polyethylene glycol (MIRALAX) packet 17 g (has no administration in time range)     And   bisacodyl (DULCOLAX) EC tablet 5 mg (has no administration in time range)     And   bisacodyl (DULCOLAX) suppository 10 mg (has no administration in time range)   sodium chloride 0.9 % infusion (has no administration in time range)   acetaminophen (TYLENOL) tablet 650 mg (has no administration in time range)     Or   acetaminophen (TYLENOL) 160 MG/5ML solution 650 mg (has no administration in time range)     Or   acetaminophen (TYLENOL) suppository 650 mg (has no administration in time range)   oxyCODONE-acetaminophen (PERCOCET) 7.5-325 MG per tablet 1 tablet (has no administration in time range)   oxyCODONE-acetaminophen (PERCOCET)  MG per tablet 1 tablet (has no administration in time range)   HYDROmorphone (DILAUDID) injection 0.5 mg (has no administration in time range)     And   naloxone (NARCAN) injection 0.4 mg (has no  administration in time range)   ondansetron (ZOFRAN) tablet 4 mg (has no administration in time range)     Or   ondansetron (ZOFRAN) injection 4 mg (has no administration in time range)   HYDROmorphone (DILAUDID) injection 1 mg (1 mg Intravenous Given 5/31/23 1814)         Medical Decision Making  70-year-old female had a fall.  Patient was found to have left pubic rami fractures.  Patient is having difficulty with ambulation.  I spoke with Dr. Davis the orthopedic surgeon on-call.  He has recommended we admit the patient.  Patient will be admitted under the services of Dr. Diane Quiroga for pain control and therapy.  I spoke with Dr. Diane Quiroga and he has accepted the patient under his services.    Problems Addressed:  Closed fracture of multiple pubic rami, left, initial encounter: complicated acute illness or injury  Fall, initial encounter: complicated acute illness or injury    Amount and/or Complexity of Data Reviewed  Labs: ordered. Decision-making details documented in ED Course.  Radiology: ordered. Decision-making details documented in ED Course.  Discussion of management or test interpretation with external provider(s): Dr. Davis - Orthopedics    Risk  Prescription drug management.  Decision regarding hospitalization.        Final diagnoses:   Closed fracture of multiple pubic rami, left, initial encounter   Fall, initial encounter       ED Disposition  ED Disposition       ED Disposition   Decision to Admit    Condition   --    Comment   Level of Care: Med/Surg [1]   Diagnosis: Closed fracture of multiple pubic rami, left, initial encounter [[id number]]   Admitting Physician: DIANE QUIROGA [[id number]]   Attending Physician: DIANE QUIROGA [[id number]]                 No follow-up provider specified.       Medication List      No changes were made to your prescriptions during this visit.            Addi Wren MD  05/31/23 6754

## 2023-05-31 NOTE — ED NOTES
"Nursing report ED to floor  Michel Cruz  78 y.o.  female    HPI:   Chief Complaint   Patient presents with    Fall    Lower Extremity Issue       Admitting doctor:   Wilmar Quiroga DO    Consulting provider(s):  Consults       Date and Time Order Name Status Description    5/31/2023  6:15 PM Inpatient Orthopedic Surgery Consult               Admitting diagnosis:   The primary encounter diagnosis was Closed fracture of multiple pubic rami, left, initial encounter. A diagnosis of Fall, initial encounter was also pertinent to this visit.    Code status:   Current Code Status       Date Active Code Status Order ID Comments User Context       5/31/2023 1815 CPR (Attempt to Resuscitate) [id number]  Sravani Casillas, APRN ED        Question Answer    Code Status (Patient has no pulse and is not breathing) CPR (Attempt to Resuscitate)    Medical Interventions (Patient has pulse or is breathing) Full Support    Level Of Support Discussed With Patient                    Allergies:   Penicillins    Intake and Output  No intake or output data in the 24 hours ending 05/31/23 1816    Weight:       05/31/23  1529   Weight: 49.4 kg (109 lb)       Most recent vitals:   Vitals:    05/31/23 1529 05/31/23 1814   BP: 151/62 141/54   BP Location: Left arm Left arm   Patient Position: Lying Lying   Pulse: 83 83   Resp: 18 18   Temp: 98.6 °F (37 °C)    TempSrc: Oral    SpO2: 97% 95%   Weight: 49.4 kg (109 lb)    Height: 154.9 cm (61\")      Oxygen Therapy: .    Active LDAs/IV Access:   Lines, Drains & Airways       Active LDAs       Name Placement date Placement time Site Days    Peripheral IV 05/31/23 1814 Right Antecubital 05/31/23 1814  Antecubital  less than 1                    Labs (abnormal labs have a star):   Labs Reviewed   COMPREHENSIVE METABOLIC PANEL   CBC WITH AUTO DIFFERENTIAL   CBC AND DIFFERENTIAL    Narrative:     The following orders were created for panel order CBC & Differential.  Procedure                         "       Abnormality         Status                     ---------                               -----------         ------                     CBC Auto Differential[[id number]]                                                         Please view results for these tests on the individual orders.       Meds given in ED:   Medications   sodium chloride 0.9 % flush 10 mL (has no administration in time range)   morphine injection 4 mg (0 mg Intravenous Hold 5/31/23 1806)   sodium chloride 0.9 % flush 10 mL (has no administration in time range)   sodium chloride 0.9 % flush 10 mL (has no administration in time range)   sodium chloride 0.9 % infusion 40 mL (has no administration in time range)   polyethylene glycol (MIRALAX) packet 17 g (has no administration in time range)     And   bisacodyl (DULCOLAX) EC tablet 5 mg (has no administration in time range)     And   bisacodyl (DULCOLAX) suppository 10 mg (has no administration in time range)   sodium chloride 0.9 % infusion (has no administration in time range)   acetaminophen (TYLENOL) tablet 650 mg (has no administration in time range)     Or   acetaminophen (TYLENOL) 160 MG/5ML solution 650 mg (has no administration in time range)     Or   acetaminophen (TYLENOL) suppository 650 mg (has no administration in time range)   oxyCODONE-acetaminophen (PERCOCET) 7.5-325 MG per tablet 1 tablet (has no administration in time range)   oxyCODONE-acetaminophen (PERCOCET)  MG per tablet 1 tablet (has no administration in time range)   HYDROmorphone (DILAUDID) injection 0.5 mg (has no administration in time range)     And   naloxone (NARCAN) injection 0.4 mg (has no administration in time range)   ondansetron (ZOFRAN) tablet 4 mg (has no administration in time range)     Or   ondansetron (ZOFRAN) injection 4 mg (has no administration in time range)   HYDROmorphone (DILAUDID) injection 1 mg (1 mg Intravenous Given 5/31/23 1814)     sodium chloride, 50 mL/hr         NIH Stroke  Scale:       Isolation/Infection(s):  No active isolations   No active infections     COVID Testing  Collected .  Resulted .    Nursing report ED to floor:  Mental status: .a/o x 3  Ambulatory status: .  Precautions: .    ED nurse phone extentsion- ..

## 2023-05-31 NOTE — ED NOTES
The following fall interventions were initiated:    [x] Patient and/or family given education   [] Call light within reach and educated on how to use   [x] Bed rails up per protocol    [] Bed locked and in the lowest position   [] Bed alarm set and on loudest setting   [] Fall wrist band applied   [] Non skid footwear applied   [] Room free of clutter   [x] Patient items within reach   [x] Adequate lighting provided  [x] Falls sign present    [x] Patient moved closer to nursing station   [] Restraints applied

## 2023-06-01 LAB
ANION GAP SERPL CALCULATED.3IONS-SCNC: 11 MMOL/L (ref 5–15)
BUN SERPL-MCNC: 18 MG/DL (ref 8–23)
BUN/CREAT SERPL: 31 (ref 7–25)
CALCIUM SPEC-SCNC: 9.2 MG/DL (ref 8.6–10.5)
CHLORIDE SERPL-SCNC: 102 MMOL/L (ref 98–107)
CO2 SERPL-SCNC: 25 MMOL/L (ref 22–29)
CREAT SERPL-MCNC: 0.58 MG/DL (ref 0.57–1)
DEPRECATED RDW RBC AUTO: 40.9 FL (ref 37–54)
EGFRCR SERPLBLD CKD-EPI 2021: 92.8 ML/MIN/1.73
ERYTHROCYTE [DISTWIDTH] IN BLOOD BY AUTOMATED COUNT: 12.3 % (ref 12.3–15.4)
GLUCOSE SERPL-MCNC: 120 MG/DL (ref 65–99)
HCT VFR BLD AUTO: 38.5 % (ref 34–46.6)
HGB BLD-MCNC: 12.4 G/DL (ref 12–15.9)
MCH RBC QN AUTO: 28.9 PG (ref 26.6–33)
MCHC RBC AUTO-ENTMCNC: 32.2 G/DL (ref 31.5–35.7)
MCV RBC AUTO: 89.7 FL (ref 79–97)
PLATELET # BLD AUTO: 145 10*3/MM3 (ref 140–450)
PMV BLD AUTO: 12.2 FL (ref 6–12)
POTASSIUM SERPL-SCNC: 4.1 MMOL/L (ref 3.5–5.2)
RBC # BLD AUTO: 4.29 10*6/MM3 (ref 3.77–5.28)
SODIUM SERPL-SCNC: 138 MMOL/L (ref 136–145)
WBC NRBC COR # BLD: 10.68 10*3/MM3 (ref 3.4–10.8)

## 2023-06-01 PROCEDURE — 97535 SELF CARE MNGMENT TRAINING: CPT

## 2023-06-01 PROCEDURE — 25010000002 ONDANSETRON PER 1 MG: Performed by: NURSE PRACTITIONER

## 2023-06-01 PROCEDURE — 36415 COLL VENOUS BLD VENIPUNCTURE: CPT | Performed by: NURSE PRACTITIONER

## 2023-06-01 PROCEDURE — 97110 THERAPEUTIC EXERCISES: CPT

## 2023-06-01 PROCEDURE — G0378 HOSPITAL OBSERVATION PER HR: HCPCS

## 2023-06-01 PROCEDURE — 97166 OT EVAL MOD COMPLEX 45 MIN: CPT

## 2023-06-01 PROCEDURE — 80048 BASIC METABOLIC PNL TOTAL CA: CPT | Performed by: NURSE PRACTITIONER

## 2023-06-01 PROCEDURE — 97530 THERAPEUTIC ACTIVITIES: CPT

## 2023-06-01 PROCEDURE — 85027 COMPLETE CBC AUTOMATED: CPT | Performed by: NURSE PRACTITIONER

## 2023-06-01 PROCEDURE — 96361 HYDRATE IV INFUSION ADD-ON: CPT

## 2023-06-01 PROCEDURE — 96375 TX/PRO/DX INJ NEW DRUG ADDON: CPT

## 2023-06-01 PROCEDURE — 97161 PT EVAL LOW COMPLEX 20 MIN: CPT | Performed by: PHYSICAL THERAPIST

## 2023-06-01 RX ORDER — LIDOCAINE 50 MG/G
1 PATCH TOPICAL
Status: DISCONTINUED | OUTPATIENT
Start: 2023-06-01 | End: 2023-06-02 | Stop reason: HOSPADM

## 2023-06-01 RX ADMIN — ONDANSETRON 4 MG: 2 INJECTION INTRAMUSCULAR; INTRAVENOUS at 18:05

## 2023-06-01 RX ADMIN — METOPROLOL SUCCINATE 25 MG: 25 TABLET, EXTENDED RELEASE ORAL at 10:00

## 2023-06-01 RX ADMIN — LIDOCAINE 1 PATCH: 700 PATCH TOPICAL at 14:21

## 2023-06-01 RX ADMIN — LATANOPROST 1 DROP: 50 SOLUTION OPHTHALMIC at 21:50

## 2023-06-01 RX ADMIN — TIMOLOL MALEATE 1 DROP: 5 SOLUTION/ DROPS OPHTHALMIC at 21:50

## 2023-06-01 RX ADMIN — SODIUM CHLORIDE 100 ML/HR: 9 INJECTION, SOLUTION INTRAVENOUS at 21:55

## 2023-06-01 RX ADMIN — Medication 10 ML: at 21:51

## 2023-06-01 RX ADMIN — TIMOLOL MALEATE 1 DROP: 5 SOLUTION/ DROPS OPHTHALMIC at 10:00

## 2023-06-01 RX ADMIN — SODIUM CHLORIDE 100 ML/HR: 9 INJECTION, SOLUTION INTRAVENOUS at 12:25

## 2023-06-01 NOTE — CASE MANAGEMENT/SOCIAL WORK
Discharge Planning Assessment   [facility]     Patient Name: Michel Cruz  MRN: [id number]  Today's Date: 6/1/2023    Admit Date: 5/31/2023        Discharge Needs Assessment       Row Name 06/01/23 1159       Living Environment    People in Home spouse    Potentially Unsafe Housing Conditions none    Primary Care Provided by self    Provides Primary Care For no one    Family Caregiver if Needed child(kassi), adult;spouse    Quality of Family Relationships helpful;involved    Able to Return to Prior Arrangements yes    Living Arrangement Comments Pt is anticipating home with HH at dc       Resource/Environmental Concerns    Resource/Environmental Concerns none    Transportation Concerns none       Food Insecurity    Within the past 12 months, you worried that your food would run out before you got the money to buy more. Never true    Within the past 12 months, the food you bought just didn't last and you didn't have money to get more. Never true       Transition Planning    Patient/Family Anticipates Transition to home with family;home with help/services    Patient/Family Anticipated Services at Transition home health care    Transportation Anticipated family or friend will provide       Discharge Needs Assessment    Readmission Within the Last 30 Days no previous admission in last 30 days    Equipment Currently Used at Home none  Pt states her brother is checking to see if he has a walker/bsc at home. If not will order DME at dc    Concerns to be Addressed adjustment to diagnosis/illness    Anticipated Changes Related to Illness none    Equipment Needed After Discharge walker, rolling;commode    Outpatient/Agency/Support Group Needs homecare agency    Discharge Facility/Level of Care Needs home with home health    Discharge Coordination/Progress SW spoke to pt about dc planning needs. Pt states she lives at home with her spouse and plans to return home at dc, if both PT/OT agree with that. Noted that OT is  recommending home with HH. Awaiting PT eval. Pt states her brother is checking to see if he has a rolling walker and bedside commode at home. If not, SW will arrange DME at time of dc. Pt is agreeable to HH. Will follow to see how pt does with PT.                   Discharge Plan    No documentation.                 Continued Care and Services - Admitted Since 5/31/2023    Coordination has not been started for this encounter.          Demographic Summary    No documentation.                  Functional Status    No documentation.                  Psychosocial    No documentation.                  Abuse/Neglect    No documentation.                  Legal    No documentation.                  Substance Abuse    No documentation.                  Patient Forms    No documentation.                     DAV Santiago

## 2023-06-01 NOTE — THERAPY EVALUATION
Patient Name: Michel Cruz  : 1945    MRN: [id number]                              Today's Date: 2023       Admit Date: 2023    Visit Dx:     ICD-10-CM ICD-9-CM   1. Closed fracture of multiple pubic rami, left, initial encounter  S32.592A 808.2   2. Fall, initial encounter  W19.XXXA E888.9   3. Impaired mobility [Z74.09 (ICD-10-CM)]  Z74.09 799.89     Patient Active Problem List   Diagnosis    Screening mammogram for high-risk patient    Closed fracture of multiple pubic rami, left, initial encounter    Acute pain due to injury    Essential hypertension    Fall with injury     Past Medical History:   Diagnosis Date    Essential hypertension 2023    History of tachycardia     Hyperlipidemia     Mitral valve prolapse     Osteoporosis     Urinary incontinence     Have trouble holding when need to empty bladder     Past Surgical History:   Procedure Laterality Date    BREAST BIOPSY       SECTION      CHOLECYSTECTOMY      EYE SURGERY      GANGLION CYST EXCISION      x2    HYSTERECTOMY      TONSILLECTOMY        General Information       Row Name 23 0731          Physical Therapy Time and Intention    Document Type evaluation  closed multiple pubic rami fx conservative management; WBAT L LE, affected L UE  -MS (r) JH (t) MS (c)     Mode of Treatment physical therapy  -MS (r) JH (t) MS (c)       Row Name 23 0731          General Information    Patient Profile Reviewed yes  -MS (r) JH (t) MS (c)     Prior Level of Function independent:;all household mobility;community mobility;ADL's;cooking;home management  -MS (r) JH (t) MS (c)     Existing Precautions/Restrictions fall  -MS (r) JH (t) MS (c)     Barriers to Rehab physical barrier  -MS (r) JH (t) MS (c)       Row Name 23 0731          Living Environment    People in Home spouse  -MS (r) JH (t) MS (c)       Row Name 23 0731          Home Main Entrance    Number of Stairs, Main Entrance two  two stairs w/o  handrails; 3 stairs back entrance with bilateral railings if needed  -MS (r) JH (t) MS (c)       Row Name 06/01/23 0731          Stairs Within Home, Primary    Number of Stairs, Within Home, Primary none  -MS (r) JH (t) MS (c)       Row Name 06/01/23 0731          Cognition    Orientation Status (Cognition) oriented x 4  -MS (r) JH (t) MS (c)       Row Name 06/01/23 0731          Safety Issues, Functional Mobility    Impairments Affecting Function (Mobility) balance;endurance/activity tolerance;pain  -MS (r) JH (t) MS (c)               User Key  (r) = Recorded By, (t) = Taken By, (c) = Cosigned By      Initials Name Provider Type    Yee Shaffer, PT, DPT, NCS Physical Therapist     Levar Bosch PT Student PT Student                   Mobility       Row Name 06/01/23 0731          Bed Mobility    Bed Mobility rolling left;scooting/bridging;sidelying-sit;rolling right  -MS (r) JH (t) MS (c)     Rolling Left [place] (Bed Mobility) modified independence  -MS (r) JH (t) MS (c)     Rolling Right [place] (Bed Mobility) minimum assist (75% patient effort)  -MS (r) JH (t) MS (c)     Scooting/Bridging [place] (Bed Mobility) modified independence;verbal cues  -MS (r) JH (t) MS (c)     Sidelying-Sit [place] (Bed Mobility) minimum assist (75% patient effort)  -MS (r) JH (t) MS (c)     Assistive Device (Bed Mobility) bed rails;head of bed elevated  -MS (r) JH (t) MS (c)       Row Name 06/01/23 0731          Bed-Chair Transfer    Bed-Chair [place] (Transfers) minimum assist (75% patient effort);2 person assist  -MS (r) JH (t) MS (c)     Assistive Device (Bed-Chair Transfers) walker, front-wheeled  -MS (r) JH (t) MS (c)       Row Name 06/01/23 0731          Sit-Stand Transfer    Sit-Stand [place] (Transfers) minimum assist (75% patient effort);verbal cues  -MS (r) JH (t) MS (c)     Assistive Device (Sit-Stand Transfers) walker, front-wheeled  -MS (r) JH (t) MS (c)       Row Name 06/01/23 0731           Gait/Stairs (Locomotion)    [place] Level (Gait) minimum assist (75% patient effort);1 person assist  -MS (r) JH (t) MS (c)     Distance in Feet (Gait) 10ft bed<>chair  -MS (r) JH (t) MS (c)       Row Name 06/01/23 0731          Mobility    Extremity Weight-bearing Status left lower extremity  -MS (r) JH (t) MS (c)     Left Lower Extremity (Weight-bearing Status) weight-bearing as tolerated (WBAT)  -MS (r) JH (t) MS (c)               User Key  (r) = Recorded By, (t) = Taken By, (c) = Cosigned By      Initials Name Provider Type    MS Yee Subramanian R, PT, DPT, NCS Physical Therapist    Levar Zhong PT Student PT Student                   Obj/Interventions       Row Name 06/01/23 0731          Range of Motion Comprehensive    General Range of Motion bilateral upper extremity ROM WFL;bilateral lower extremity ROM WFL  -MS (r) JH (t) MS (c)       Row Name 06/01/23 0731          Strength Comprehensive (MMT)    General Manual Muscle Testing (MMT) Assessment lower extremity strength deficits identified;upper extremity strength deficits identified  -MS (r) JH (t) MS (c)     Comment, General Manual Muscle Testing (MMT) Assessment B UE 4/5 grossly; R LE 4/5 grossly, L LE 4-/5 grossly  -MS (r) JH (t) MS (c)       Row Name 06/01/23 0731          Balance    Balance Assessment sitting static balance;sitting dynamic balance;sit to stand dynamic balance;standing static balance;standing dynamic balance  -MS (r) JH (t) MS (c)     Static Sitting Balance independent  -MS (r) JH (t) MS (c)     Dynamic Sitting Balance independent  -MS (r) JH (t) MS (c)     Position, Sitting Balance unsupported;sitting edge of bed  -MS (r) JH (t) MS (c)     Sit to Stand Dynamic Balance contact guard  -MS (r) JH (t) MS (c)     Static Standing Balance standby assist  -MS (r) JH (t) MS (c)     Dynamic Standing Balance contact guard  -MS (r) JH (t) MS (c)     Position/Device Used, Standing Balance supported;walker, rolling  -MS (r) JH (t)  MS (c)     Balance Interventions sitting;standing;sit to stand  -MS (r) JH (t) MS (c)       Row Name 06/01/23 0731          Sensory Assessment (Somatosensory)    Sensory Assessment (Somatosensory) left LE  -MS (r) JH (t) MS (c)     Left LE Sensory Assessment general sensation;impaired  -MS (r) JH (t) MS (c)               User Key  (r) = Recorded By, (t) = Taken By, (c) = Cosigned By      Initials Name Provider Type    MS Yee Subramanian JESUS, PT, DPT, NCS Physical Therapist     Levar Bosch, PT Student PT Student                   Goals/Plan       Row Name 06/01/23 0731          Bed Mobility Goal 1 (PT)    Activity/Assistive Device (Bed Mobility Goal 1, PT) bridging;rolling to left;rolling to right;scooting;sit to supine;supine to sit  -MS (r) JH (t) MS (c)     [place] Level/Cues Needed (Bed Mobility Goal 1, PT) standby assist  -MS (r) JH (t) MS (c)     Time Frame (Bed Mobility Goal 1, PT) long term goal (LTG)  -MS (r) JH (t) MS (c)     Progress/Outcomes (Bed Mobility Goal 1, PT) new goal  -MS (r) JH (t) MS (c)       Row Name 06/01/23 0731          Transfer Goal 1 (PT)    Activity/Assistive Device (Transfer Goal 1, PT) sit-to-stand/stand-to-sit;bed-to-chair/chair-to-bed;toilet  -MS (r) JH (t) MS (c)     [place] Level/Cues Needed (Transfer Goal 1, PT) standby assist  -MS (r) JH (t) MS (c)     Time Frame (Transfer Goal 1, PT) long term goal (LTG)  -MS (r) JH (t) MS (c)     Progress/Outcome (Transfer Goal 1, PT) new goal  -MS (r) JH (t) MS (c)       Row Name 06/01/23 0731          Gait Training Goal 1 (PT)    Activity/Assistive Device (Gait Training Goal 1, PT) gait (walking locomotion);assistive device use;walker, rolling;decrease fall risk;improve balance and speed;increase endurance/gait distance  -MS (r) JH (t) MS (c)     [place] Level (Gait Training Goal 1, PT) standby assist  -MS (r) JH (t) MS (c)     Distance (Gait Training Goal 1, PT) 50ft with normal hemodynamic response to exercise  -MS (r) JH  (t) MS (c)     Time Frame (Gait Training Goal 1, PT) long term goal (LTG)  -MS (r) JH (t) MS (c)     Progress/Outcome (Gait Training Goal 1, PT) new goal  -MS (r) JH (t) MS (c)       Row Name 06/01/23 0731          Stairs Goal 1 (PT)    Activity/Assistive Device (Stairs Goal 1, PT) ascending stairs;descending stairs  -MS (r) JH (t) MS (c)     [place] Level/Cues Needed (Stairs Goal 1, PT) contact guard required  -MS (r) JH (t) MS (c)     Number of Stairs (Stairs Goal 1, PT) 4  -MS (r) JH (t) MS (c)     Time Frame (Stairs Goal 1, PT) long term goal (LTG)  -MS (r) JH (t) MS (c)     Progress/Outcome (Stairs Goal 1, PT) new goal  -MS (r) JH (t) MS (c)       Row Name 06/01/23 0731          Therapy Assessment/Plan (PT)    Planned Therapy Interventions (PT) balance training;bed mobility training;gait training;strengthening  -MS (r) JH (t) MS (c)               User Key  (r) = Recorded By, (t) = Taken By, (c) = Cosigned By      Initials Name Provider Type    Yee Shaffer, PT, DPT, NCS Physical Therapist    Levar Zhong, PT Student PT Student                   Clinical Impression       Row Name 06/01/23 0731          Pain    Pretreatment Pain Rating 9/10  -MS (r) JH (t) MS (c)     Posttreatment Pain Rating 10/10  -MS (r) JH (t) MS (c)     Pain Location - Side/Orientation Left  -MS (r) JH (t) MS (c)     Pain Location lower  -MS (r) JH (t) MS (c)     Pain Location - extremity;hip  -MS (r) JH (t) MS (c)     Pre/Posttreatment Pain Comment Pain reports that radiates down LLE to toes  -MS (r) JH (t) MS (c)     Pain Intervention(s) Medication (See MAR);Repositioned;Ambulation/increased activity  -MS (r) JH (t) MS (c)       Row Name 06/01/23 0731          Plan of Care Review    Plan of Care Reviewed With patient  -MS (r) SHARMILA (t) MS (c)     Progress no change  -MS (r) SHARMILA (t) MS (c)     Outcome Evaluation Patient presented to evaluation AxOx4 with reports of 9/10 NPRS when moving. She was able to perform bed mobility with  "at most minimal assistance. Patient was able to respond to verbal cues well and showed appropriate motor planning/coordination. Upon standing and begining to mobilize, she became hypotensive. We were able to sit her in a chair and take her BP which was 66/33. Once returned to the bed with legs elevated her blood pressure returned to 130/64. Nursing was notified to assist patient with voiding via bed pan. Additionally, the patient denies any changes in sensation or visual changes besides \"my floater has returned\". PT recommends further intervention to promote a normal hemodynamic response to exercise and increase ambulation in anticipation for discharge. PT recommends discharge to home with assist.  -MS (r) JH (t) MS (c)       Row Name 06/01/23 0731          Therapy Assessment/Plan (PT)    Patient/Family Therapy Goals Statement (PT) discharge home  -MS (r) JH (t) MS (c)     Rehab Potential (PT) good, to achieve stated therapy goals  -MS (r) JH (t) MS (c)     Criteria for Skilled Interventions Met (PT) yes  -MS (r) JH (t) MS (c)     Therapy Frequency (PT) 2 times/day  -MS (r) JH (t) MS (c)     Predicted Duration of Therapy Intervention (PT) until discharge  -MS (r) JH (t) MS (c)       Row Name 06/01/23 0731          Vital Signs    Post SpO2 (%) 92  -MS (r) JH (t) MS (c)     O2 Delivery Post Treatment room air  -MS (r) JH (t) MS (c)       Row Name 06/01/23 0731          Positioning and Restraints    Pre-Treatment Position in bed  -MS (r) JH (t) MS (c)     Post Treatment Position bed  -MS (r) JH (t) MS (c)     In Bed notified nsg;call light within reach;encouraged to call for assist;with family/caregiver;side rails up x2;SCD pump applied;supine  -MS (r) JH (t) MS (c)               User Key  (r) = Recorded By, (t) = Taken By, (c) = Cosigned By      Initials Name Provider Type    Yee Shaffer, PT, DPT, NCS Physical Therapist    Levar Zhong, JOSELINE Student PT Student                   Outcome Measures       Row Name " 06/01/23 0815 06/01/23 0731       How much help from another person do you currently need...    Turning from your back to your side while in flat bed without using bedrails? 3  -KS 3  -MS (r) JH (t) MS (c)    Moving from lying on back to sitting on the side of a flat bed without bedrails? 3  -KS 3  -MS (r) JH (t) MS (c)    Moving to and from a bed to a chair (including a wheelchair)? 3  -KS 3  -MS (r) JH (t) MS (c)    Standing up from a chair using your arms (e.g., wheelchair, bedside chair)? 3  -KS 3  -MS (r) JH (t) MS (c)    Climbing 3-5 steps with a railing? 2  -KS 2  -MS (r) JH (t) MS (c)    To walk in hospital room? 3  -KS 3  -MS (r) JH (t) MS (c)    AM-PAC 6 Clicks Score (PT) 17  -KS 17  -MS (r) JH (t)    Highest level of mobility 5 --> Static standing  -KS 5 --> Static standing  -MS (r) JH (t)      Row Name 06/01/23 0731 06/01/23 0730       Functional Assessment    Outcome Measure Options AM-PAC 6 Clicks Basic Mobility (PT)  -MS (r) JH (t) MS (c) AM-PAC 6 Clicks Daily Activity (OT)  -CS              User Key  (r) = Recorded By, (t) = Taken By, (c) = Cosigned By      Initials Name Provider Type    MS Subramanian Yee R, PT, DPT, NCS Physical Therapist    Nancy Castano, OTR/L, CNT Occupational Therapist    Val Salinas, RN Registered Nurse    Levar Zhong, PT Student PT Student                                 Physical Therapy Education       Title: PT OT SLP Therapies (Done)       Topic: Physical Therapy (Done)       Point: Mobility training (Done)       Learning Progress Summary             Patient Acceptance, E, VU by  at 6/1/2023 0731    Comment: Patient was educated on PT role in care, repositioning, and safe ambulation with a walker.                         Point: Body mechanics (Done)       Learning Progress Summary             Patient Acceptance, E, VU by  at 6/1/2023 0731    Comment: Patient was educated on PT role in care, repositioning, and safe ambulation with a walker.             "                             User Key       Initials Effective Dates Name Provider Type Discipline     04/27/23 -  Levar Bosch, JOSELINE Student PT Student PT                  PT Recommendation and Plan  Planned Therapy Interventions (PT): balance training, bed mobility training, gait training, strengthening  Plan of Care Reviewed With: patient  Progress: no change  Outcome Evaluation: Patient presented to evaluation AxOx4 with reports of 9/10 NPRS when moving. She was able to perform bed mobility with at most minimal assistance. Patient was able to respond to verbal cues well and showed appropriate motor planning/coordination. Upon standing and begining to mobilize, she became hypotensive. We were able to sit her in a chair and take her BP which was 66/33. Once returned to the bed with legs elevated her blood pressure returned to 130/64. Nursing was notified to assist patient with voiding via bed pan. Additionally, the patient denies any changes in sensation or visual changes besides \"my floater has returned\". PT recommends further intervention to promote a normal hemodynamic response to exercise and increase ambulation in anticipation for discharge. PT recommends discharge to home with assist.     Time Calculation:    PT Charges       Row Name 06/01/23 0731             Time Calculation    Start Time 0731  -MS (r) JH (t) MS (c)      Stop Time 0831  -MS (r) JH (t) MS (c)      Time Calculation (min) 60 min  -MS (r) JH (t)      PT Received On 06/01/23  -MS (r) JH (t) MS (c)      PT Goal Re-Cert Due Date 06/11/23  -MS (r) JH (t) MS (c)         Untimed Charges    PT Eval/Re-eval Minutes 65  5 minutes chart review  -MS (r) JH (t) MS (c)         Total Minutes    Untimed Charges Total Minutes 65  -MS (r) JH (t)       Total Minutes 65  -MS (r) JH (t)                User Key  (r) = Recorded By, (t) = Taken By, (c) = Cosigned By      Initials Name Provider Type    MS Subramanian Yee R, PT, DPT, NCS Physical Therapist     Hiro, " Levar PT Student PT Student                      PT G-Codes  Outcome Measure Options: AM-PAC 6 Clicks Basic Mobility (PT)  AM-PAC 6 Clicks Score (PT): 17  AM-PAC 6 Clicks Score (OT): 19  PT Discharge Summary  Anticipated Discharge Disposition (PT): home with assist    Levar Bosch PT Student  6/1/2023

## 2023-06-01 NOTE — THERAPY TREATMENT NOTE
Acute Care - Physical Therapy Treatment Note  [facility]     Patient Name: Michel Cruz  : 1945  MRN: [id number]  Today's Date: 2023      Visit Dx:     ICD-10-CM ICD-9-CM   1. Closed fracture of multiple pubic rami, left, initial encounter  S32.592A 808.2   2. Fall, initial encounter  W19.XXXA E888.9   3. Impaired mobility [Z74.09 (ICD-10-CM)]  Z74.09 799.89     Patient Active Problem List   Diagnosis    Screening mammogram for high-risk patient    Closed fracture of multiple pubic rami, left, initial encounter    Acute pain due to injury    Essential hypertension    Fall with injury     Past Medical History:   Diagnosis Date    Essential hypertension 2023    History of tachycardia     Hyperlipidemia     Mitral valve prolapse     Osteoporosis     Urinary incontinence     Have trouble holding when need to empty bladder     Past Surgical History:   Procedure Laterality Date    BREAST BIOPSY       SECTION      CHOLECYSTECTOMY      EYE SURGERY      GANGLION CYST EXCISION      x2    HYSTERECTOMY      TONSILLECTOMY       PT Assessment (last 12 hours)       PT Evaluation and Treatment       Row Name 23 1505          Physical Therapy Time and Intention    Subjective Information complains of;pain  -AE     Document Type therapy note (daily note)  -AE     Mode of Treatment physical therapy  -AE       Row Name 23 1505          General Information    Existing Precautions/Restrictions fall  -AE       Row Name 23 1505          Pain    Pretreatment Pain Rating 7/10  -AE     Posttreatment Pain Rating 8/10  -AE     Pain Location - Side/Orientation Left  -AE     Pain Location lower  -AE     Pain Location - hip  -AE     Pain Intervention(s) Medication (See MAR)  -AE       Row Name 23 1505          Bed Mobility    Scooting/Bridging [place] (Bed Mobility) modified independence;verbal cues  -AE     Supine-Sit [place] (Bed Mobility) minimum assist (75% patient effort)  -AE      Assistive Device (Bed Mobility) bed rails;head of bed elevated  -AE       Row Name 06/01/23 1505          Sit-Stand Transfer    Sit-Stand [place] (Transfers) minimum assist (75% patient effort);verbal cues  -AE       Row Name 06/01/23 1505          Gait/Stairs (Locomotion)    [place] Level (Gait) minimum assist (75% patient effort);verbal cues  -AE     Assistive Device (Gait) walker, front-wheeled  -AE     Distance in Feet (Gait) 3 SIDE STEPS TOWARDS HOB  -AE       Row Name 06/01/23 1505          Balance    Static Sitting Balance supervision  -AE       Row Name 06/01/23 1505          Hip (Therapeutic Exercise)    Hip (Therapeutic Exercise) AROM (active range of motion)  -AE       Row Name 06/01/23 1505          Knee (Therapeutic Exercise)    Knee (Therapeutic Exercise) AROM (active range of motion)  -AE     Knee AROM (Therapeutic Exercise) LAQ (long arc quad);bilateral;10 repetitions  -AE       Row Name 06/01/23 1505          Ankle (Therapeutic Exercise)    Ankle (Therapeutic Exercise) AROM (active range of motion)  -AE     Ankle AROM (Therapeutic Exercise) dorsiflexion;plantarflexion;20 repititions  -AE       Row Name 06/01/23 1505          Vital Signs    Pre Systolic BP Rehab 126  -AE     Pre Treatment Diastolic BP 41  -AE     Intra Systolic BP Rehab 136  -AE     Intra Treatment Diastolic BP 37  -AE       Row Name 06/01/23 1505          Positioning and Restraints    Pre-Treatment Position in bed  -AE     Post Treatment Position bed  -AE     In Bed fowlers;call light within reach  -AE               User Key  (r) = Recorded By, (t) = Taken By, (c) = Cosigned By      Initials Name Provider Type    AE Juli Mendosa, PTA Physical Therapist Assistant                    Physical Therapy Education       Title: PT OT SLP Therapies (Done)       Topic: Physical Therapy (Done)       Point: Mobility training (Done)       Learning Progress Summary             Patient Acceptance, E, VU by  at 6/1/2023 1533     Comment: Patient was educated on PT role in care, repositioning, and safe ambulation with a walker.                         Point: Body mechanics (Done)       Learning Progress Summary             Patient Acceptance, E, VU by  at 6/1/2023 0731    Comment: Patient was educated on PT role in care, repositioning, and safe ambulation with a walker.                                         User Key       Initials Effective Dates Name Provider Type Discipline     04/27/23 -  Levar Bosch, PT Student PT Student PT                  PT Recommendation and Plan             Time Calculation:    PT Charges       Row Name 06/01/23 1547 06/01/23 0731          Time Calculation    Start Time 1505  -AE 0731  -MS (r) JH (t) MS (c)     Stop Time 1543  -AE 0831  -MS (r) JH (t) MS (c)     Time Calculation (min) 38 min  -AE 60 min  -MS (r) JH (t)     PT Received On 06/01/23  -AE 06/01/23  -MS (r) JH (t) MS (c)     PT Goal Re-Cert Due Date 06/11/23  -AE 06/11/23  -MS (r) JH (t) MS (c)        Time Calculation- PT    Total Timed Code Minutes- PT 38 minute(s)  -AE --        Timed Charges    12208 - PT Therapeutic Exercise Minutes 8  -AE --     72810 - PT Therapeutic Activity Minutes 30  -AE --        Untimed Charges    PT Eval/Re-eval Minutes -- 65  5 minutes chart review  -MS (r) JH (t) MS (c)        Total Minutes    Timed Charges Total Minutes 38  -AE --     Untimed Charges Total Minutes -- 65  -MS (r) JH (t)      Total Minutes 38  -AE 65  -MS (r) JH (t)               User Key  (r) = Recorded By, (t) = Taken By, (c) = Cosigned By      Initials Name Provider Type    AE Juli Mendosa PTA Physical Therapist Assistant    Yee Shaffer, PT, DPT, NCS Physical Therapist     Levar Bosch, PT Student PT Student                  Therapy Charges for Today       Code Description Service Date Service Provider Modifiers Qty    78166448223 HC PT THERAPEUTIC ACT EA 15 MIN 6/1/2023 Juli Mendosa PTA GP 2    12777412564 HC PT THER PROC EA 15  MIN 6/1/2023 Juli Mendosa, PTA GP 1            PT G-Codes  Outcome Measure Options: AM-PAC 6 Clicks Basic Mobility (PT)  AM-PAC 6 Clicks Score (PT): 17  AM-PAC 6 Clicks Score (OT): 19    Juli Mendosa, BHAVESH  6/1/2023

## 2023-06-01 NOTE — PLAN OF CARE
Goal Outcome Evaluation:           Progress: improving  Outcome Evaluation: Pt AOx4, hypotensive this am with P.T. for a moment and then stabilized soon after, likely related to first time being up since last night admissiion and bedrest. VSS rest of shift with pain noted with P.T. but not much at rest, lidocaine patch placed today to L sacral area. Continue to monitor for pain, and d/c plan will be to home possibly with hh. Safety measures in place.

## 2023-06-01 NOTE — PLAN OF CARE
Goal Outcome Evaluation:  Plan of Care Reviewed With: patient        Progress: no change  Outcome Evaluation: A&Ox4. VSS. C/O pain but denies need for pain meds at this time. PPP. Baseline n/t to toes. IVF infusing per orders. Hx of urinary frequency, purewick applied for comfort. Safety maintained.

## 2023-06-01 NOTE — THERAPY EVALUATION
Patient Name: Michel Cruz  : 1945    MRN: [id number]                              Today's Date: 2023       Admit Date: 2023    Visit Dx:     ICD-10-CM ICD-9-CM   1. Closed fracture of multiple pubic rami, left, initial encounter  S32.592A 808.2   2. Fall, initial encounter  W19.XXXA E888.9     Patient Active Problem List   Diagnosis    Screening mammogram for high-risk patient    Closed fracture of multiple pubic rami, left, initial encounter    Acute pain due to injury    Essential hypertension    Fall with injury     Past Medical History:   Diagnosis Date    Essential hypertension 2023    History of tachycardia     Hyperlipidemia     Mitral valve prolapse     Osteoporosis     Urinary incontinence     Have trouble holding when need to empty bladder     Past Surgical History:   Procedure Laterality Date    BREAST BIOPSY       SECTION      CHOLECYSTECTOMY      EYE SURGERY      GANGLION CYST EXCISION      x2    HYSTERECTOMY      TONSILLECTOMY        General Information       Row Name 23 0730          OT Time and Intention    Document Type evaluation  Pt presents after falling.  Acute L superior and inferior pubic rami fxs.  -CS     Mode of Treatment occupational therapy;co-treatment  -CS       Row Name 23 0730          General Information    Patient Profile Reviewed yes  -CS     Prior Level of Function independent:;all household mobility;community mobility;ADL's;cooking;home management;driving  -CS     Existing Precautions/Restrictions fall  WBAT LLE  -CS     Barriers to Rehab physical barrier  -CS       Row Name 23 2630          Occupational Profile    Environmental Supports and Barriers (Occupational Profile) walk in and tub shower, family member may have DME if needed  -CS       Row Name 23          Living Environment    People in Home spouse  -CS       Row Name 2330          Home Main Entrance    Number of Stairs, Main Entrance two  no  handrails at 2 stairs in garage; Pt has 3 stairs on back deck with sue handrails if needed  -       Row Name 06/01/23 0730          Stairs Within Home, Primary    Number of Stairs, Within Home, Primary none  -CS       Row Name 06/01/23 0730          Cognition    Orientation Status (Cognition) oriented x 4  -CS       Row Name 06/01/23 0730          Safety Issues, Functional Mobility    Impairments Affecting Function (Mobility) pain;range of motion (ROM);endurance/activity tolerance;balance  -CS               User Key  (r) = Recorded By, (t) = Taken By, (c) = Cosigned By      Initials Name Provider Type    CS Nancy Frederick S, OTR/L, CNT Occupational Therapist                     Mobility/ADL's       Row Name 06/01/23 0730          Bed Mobility    Bed Mobility supine-sit;rolling left;rolling right  -     Rolling Left [place] (Bed Mobility) contact guard  -CS     Rolling Right [place] (Bed Mobility) contact guard  -     Scooting/Bridging [place] (Bed Mobility) contact guard  -     Supine-Sit [place] (Bed Mobility) minimum assist (75% patient effort);verbal cues;2 person assist  -     Bed Mobility, Safety Issues decreased use of legs for bridging/pushing  -     Assistive Device (Bed Mobility) bed rails;head of bed elevated  -       Row Name 06/01/23 0730          Transfers    Transfers sit-stand transfer;stand-sit transfer  -       Row Name 06/01/23 0730          Sit-Stand Transfer    Sit-Stand [place] (Transfers) contact guard;verbal cues;minimum assist (75% patient effort)  -     Assistive Device (Sit-Stand Transfers) walker, front-wheeled  -       Row Name 06/01/23 0730          Stand-Sit Transfer    Stand-Sit [place] (Transfers) contact guard;verbal cues  -     Assistive Device (Stand-Sit Transfers) walker, front-wheeled  -       Row Name 06/01/23 0730          Functional Mobility    Functional Mobility- Ind. Level contact guard assist;minimum assist (75% patient  effort);verbal cues required  -     Functional Mobility- Device walker, front-wheeled  -     Functional Mobility- Comment Pt took 4-5 steps to the bedside chair.  -       Row Name 06/01/23 0730          Activities of Daily Living    BADL Assessment/Intervention lower body dressing  -       Row Name 06/01/23 0730          Lower Body Dressing Assessment/Training    [place] Level (Lower Body Dressing) don;doff;socks;maximum assist (25% patient effort)  -     Position (Lower Body Dressing) edge of bed sitting  -               User Key  (r) = Recorded By, (t) = Taken By, (c) = Cosigned By      Initials Name Provider Type    Nancy Castano, OTR/L, WILLIAN Occupational Therapist                   Obj/Interventions       Row Name 06/01/23 0730          Sensory Assessment (Somatosensory)    Sensory Assessment (Somatosensory) UE sensation intact  -       Row Name 06/01/23 0730          Vision Assessment/Intervention    Vision Assessment Comment Pt reports she has floaters and glaucoma, on medication  -       Row Name 06/01/23 0730          Range of Motion Comprehensive    General Range of Motion bilateral upper extremity ROM WNL  -       Row Name 06/01/23 0730          Strength Comprehensive (MMT)    Comment, General Manual Muscle Testing (MMT) Assessment BUE strength: 4/5, soreness noted in L shoulder however full AROM achieved  -       Row Name 06/01/23 0730          Balance    Balance Assessment sitting static balance;sitting dynamic balance;standing static balance;standing dynamic balance  -     Static Sitting Balance independent  -CS     Dynamic Sitting Balance independent  -     Position, Sitting Balance sitting edge of bed  -     Static Standing Balance contact guard  -CS     Dynamic Standing Balance contact guard  -               User Key  (r) = Recorded By, (t) = Taken By, (c) = Cosigned By      Initials Name Provider Type    Nancy Castano, OTR/L, WILLIAN Occupational Therapist                    Goals/Plan       Row Name 06/01/23 0900          Transfer Goal 1 (OT)    Activity/Assistive Device (Transfer Goal 1, OT) tub;toilet  -CS     [place] Level/Cues Needed (Transfer Goal 1, OT) supervision required  -CS     Time Frame (Transfer Goal 1, OT) long term goal (LTG)  -CS     Progress/Outcome (Transfer Goal 1, OT) new goal  -CS       Row Name 06/01/23 0900          Dressing Goal 1 (OT)    Activity/Device (Dressing Goal 1, OT) lower body dressing  -CS     [place]/Cues Needed (Dressing Goal 1, OT) supervision required  -CS     Time Frame (Dressing Goal 1, OT) long term goal (LTG)  -CS     Progress/Outcome (Dressing Goal 1, OT) new goal  -CS       Row Name 06/01/23 0900          Toileting Goal 1 (OT)    Activity/Device (Toileting Goal 1, OT) toileting skills, all  -CS     [place] Level/Cues Needed (Toileting Goal 1, OT) supervision required  -CS     Time Frame (Toileting Goal 1, OT) long term goal (LTG)  -CS     Progress/Outcome (Toileting Goal 1, OT) new goal  -CS       Row Name 06/01/23 0900          Therapy Assessment/Plan (OT)    Planned Therapy Interventions (OT) activity tolerance training;adaptive equipment training;BADL retraining;transfer/mobility retraining;patient/caregiver education/training;occupation/activity based interventions;functional balance retraining;strengthening exercise  -CS               User Key  (r) = Recorded By, (t) = Taken By, (c) = Cosigned By      Initials Name Provider Type    CS Nancy Frederick, OTR/L, CNT Occupational Therapist                   Clinical Impression       Row Name 06/01/23 0730          Pain Assessment    Pretreatment Pain Rating 9/10  -CS     Pain Location - Side/Orientation Left  -CS     Pain Location lower  -CS     Pain Location - extremity;hip  -CS     Pre/Posttreatment Pain Comment Pt reports pain radiates down LLE  -CS     Pain Intervention(s) Medication (See MAR);Repositioned;Ambulation/increased activity  -       Row  Name 06/01/23 0730          Plan of Care Review    Plan of Care Reviewed With patient  -CS     Progress no change  -CS     Outcome Evaluation OT evaluation completed.  Pt is A&Ox4.  She was I with all ADLs, IADLs, and mobility prior to admit.  Pt completed bed mobility with min A x2, sit to stand transfer with CGA/min A, and functional mobility a short distance to bedside chair with CGA/min A.  Pt completed LB dressing sitting EOB with mod A.  Shortly after initiation of walking, pt became nauseous and diaphoretic.  Pt sat down in bedside chair and her BP was 62/33.  Pt assisted with min A x2 for stand pivot transfer back to bed.  Her LEs were elevated and her BP tiago to 103/47 after 3 min and 130/64 after 8-10 min.  Pt then began to report significant abdominal discomfort and requested a bed pan.  Bed pan placed and RN notified.  OT will cont to follow to maximize her I and safety with ADLs and functional mobility.  It is recommended for pt to discharge home with assist and HH OT and PT.  -CS       Row Name 06/01/23 0730          Therapy Assessment/Plan (OT)    Patient/Family Therapy Goal Statement (OT) to go home  -CS     Rehab Potential (OT) good, to achieve stated therapy goals  -CS     Criteria for Skilled Therapeutic Interventions Met (OT) yes;skilled treatment is necessary  -CS     Therapy Frequency (OT) 5 times/wk  -CS     Predicted Duration of Therapy Intervention (OT) until hospital discharge  -CS       Row Name 06/01/23 0730          Therapy Plan Review/Discharge Plan (OT)    Anticipated Discharge Disposition (OT) home with assist;home with home health  -CS       Row Name 06/01/23 0730          Vital Signs    Pre SpO2 (%) 92  -CS     O2 Delivery Pre Treatment room air  -CS     Pre Patient Position Supine  -CS       Row Name 06/01/23 0730          Positioning and Restraints    Pre-Treatment Position in bed  -CS     Post Treatment Position bed  -CS     In Bed fowlers;notified nsg;call light within  reach;with family/caregiver;side rails up x3  -CS               User Key  (r) = Recorded By, (t) = Taken By, (c) = Cosigned By      Initials Name Provider Type    Nancy Castano OTR/L, WILLIAN Occupational Therapist                   Outcome Measures       Row Name 06/01/23 0730          How much help from another is currently needed...    Putting on and taking off regular lower body clothing? 2  -CS     Bathing (including washing, rinsing, and drying) 3  -CS     Toileting (which includes using toilet bed pan or urinal) 2  -CS     Putting on and taking off regular upper body clothing 4  -CS     Taking care of personal grooming (such as brushing teeth) 4  -CS     Eating meals 4  -CS     AM-PAC 6 Clicks Score (OT) 19  -CS       Row Name 06/01/23 0731          How much help from another person do you currently need...    Turning from your back to your side while in flat bed without using bedrails? 3 (P)   -     Moving from lying on back to sitting on the side of a flat bed without bedrails? 3 (P)   -JH     Moving to and from a bed to a chair (including a wheelchair)? 3 (P)   -JH     Standing up from a chair using your arms (e.g., wheelchair, bedside chair)? 3 (P)   -JH     Climbing 3-5 steps with a railing? 2 (P)   -JH     To walk in hospital room? 3 (P)   -     AM-PAC 6 Clicks Score (PT) 17 (P)   -     Highest level of mobility 5 --> Static standing (P)   -       Row Name 06/01/23 0731 06/01/23 0730       Functional Assessment    Outcome Measure Options AM-PAC 6 Clicks Basic Mobility (PT) (P)   - AM-PAC 6 Clicks Daily Activity (OT)  -CS              User Key  (r) = Recorded By, (t) = Taken By, (c) = Cosigned By      Initials Name Provider Type    Nancy Castano OTR/L, WILLIAN Occupational Therapist    Levar Zhong, PT Student PT Student                      OT Recommendation and Plan  Planned Therapy Interventions (OT): activity tolerance training, adaptive equipment training, BADL retraining,  transfer/mobility retraining, patient/caregiver education/training, occupation/activity based interventions, functional balance retraining, strengthening exercise  Therapy Frequency (OT): 5 times/wk  Plan of Care Review  Plan of Care Reviewed With: patient  Progress: no change  Outcome Evaluation: OT evaluation completed.  Pt is A&Ox4.  She was I with all ADLs, IADLs, and mobility prior to admit.  Pt completed bed mobility with min A x2, sit to stand transfer with CGA/min A, and functional mobility a short distance to bedside chair with CGA/min A.  Pt completed LB dressing sitting EOB with mod A.  Shortly after initiation of walking, pt became nauseous and diaphoretic.  Pt sat down in bedside chair and her BP was 62/33.  Pt assisted with min A x2 for stand pivot transfer back to bed.  Her LEs were elevated and her BP tiago to 103/47 after 3 min and 130/64 after 8-10 min.  Pt then began to report significant abdominal discomfort and requested a bed pan.  Bed pan placed and RN notified.  OT will cont to follow to maximize her I and safety with ADLs and functional mobility.  It is recommended for pt to discharge home with assist and HH OT and PT.     Time Calculation:    Time Calculation- OT       Row Name 06/01/23 0903             Time Calculation- OT    OT Start Time 0730  -CS      OT Stop Time 0840  -CS      OT Time Calculation (min) 70 min  -CS      Total Timed Code Minutes- OT 10 minute(s)  -CS      OT Received On 06/01/23  -CS      OT Goal Re-Cert Due Date 06/11/23  -CS         Timed Charges    81373 - OT Self Care/Mgmt Minutes 10  -CS         Untimed Charges    OT Eval/Re-eval Minutes 60  -CS         Total Minutes    Timed Charges Total Minutes 10  -CS      Untimed Charges Total Minutes 60  -CS       Total Minutes 70  -CS                User Key  (r) = Recorded By, (t) = Taken By, (c) = Cosigned By      Initials Name Provider Type    CS Nancy Frederick, OTR/L, CNT Occupational Therapist                  Therapy  Charges for Today       Code Description Service Date Service Provider Modifiers Qty    67497442835 HC OT SELF CARE/MGMT/TRAIN EA 15 MIN 6/1/2023 Nancy Frederick, OTR/L, CNT GO 1    58857020963 HC OT EVAL MOD COMPLEXITY 4 6/1/2023 Nancy Frederick, OTR/L, CNT GO 1                 Nancy Frederick, OTR/L, CNT  6/1/2023

## 2023-06-01 NOTE — PLAN OF CARE
Goal Outcome Evaluation:  Plan of Care Reviewed With: patient        Progress: no change  Outcome Evaluation: OT evaluation completed.  Pt is A&Ox4.  She was I with all ADLs, IADLs, and mobility prior to admit.  Pt completed bed mobility with min A x2, sit to stand transfer with CGA/min A, and functional mobility a short distance to bedside chair with CGA/min A.  Pt completed LB dressing sitting EOB with mod A.  Shortly after initiation of walking, pt became nauseous and diaphoretic.  Pt sat down in bedside chair and her BP was 62/33.  Pt assisted with min A x2 for stand pivot transfer back to bed.  Her LEs were elevated and her BP tiago to 103/47 after 3 min and 130/64 after 8-10 min.  Pt then began to report significant abdominal discomfort and requested a bed pan.  Bed pan placed and RN notified.  OT will cont to follow to maximize her I and safety with ADLs and functional mobility.  It is recommended for pt to discharge home with assist and HH OT and PT.

## 2023-06-01 NOTE — CONSULTS
Orthopaedic Surgery Consult Note      6/1/2023   07:08 CDT    Name:  Michel Cruz  MRN:    [id number]     Acct:     [id number]   Room:  [address] [place] [address] Day: 0     Admit Date: 5/31/2023  PCP: Isrrael Wren MD    Consulting Provider: Dr. Wilmar Quiroga    Subjective:     C/C: Left hip and leg pain status post fall    HPI: Ms. Cruz is a 78-year-old female who was admitted through the ER yesterday after sustaining a ground-level fall at home-she was reaching for something in the cabinet when she fell, she does not remember why she fell other than it happened quickly.  Fell on her left side and also hit her head on the .  Unsure regarding loss of consciousness.  Also states that she hit her left shoulder.  Had sudden onset, severe left leg pain with inability to bear weight.  She was transported to the ER where imaging studies revealed left superior and inferior pubic rami fractures.  CT scan of the head and radiographs of the left shoulder were unremarkable.  Due to pain and immobility, she was admitted.  Orthopedics was consulted for pelvic ring injury.  On interview this morning, she feels that pain is ongoing but improved from yesterday.  Has soreness to bilateral shoulders but feels that this is mild in nature.  Denies any numbness or tingling to her extremities.  Denies any chest pain, shortness of breath.  Denies any headache or blurry vision.  Lives at home with .  Independent ambulator prior to the fall.      Code Status:    Code Status and Medical Interventions:   Ordered at: 05/31/23 0350     Level Of Support Discussed With:    Patient     Code Status (Patient has no pulse and is not breathing):    CPR (Attempt to Resuscitate)     Medical Interventions (Patient has pulse or is breathing):    Full Support         Past Medical History:    Past Medical History:   Diagnosis Date    Essential hypertension 5/31/2023    History of tachycardia     Hyperlipidemia     Mitral valve prolapse      Osteoporosis     Urinary incontinence     Have trouble holding when need to empty bladder       Past Surgical History:    Past Surgical History:   Procedure Laterality Date    BREAST BIOPSY       SECTION      CHOLECYSTECTOMY      EYE SURGERY      GANGLION CYST EXCISION      x2    HYSTERECTOMY      TONSILLECTOMY         Current Medications:   Prior to Admission medications    Medication Sig Start Date End Date Taking? Authorizing Provider   calcium citrate-vitamin d (CALCITRATE) 315-250 MG-UNIT tablet tablet Take 600 mg by mouth.    Emergency, Nurse Radha, RN   Calcium Citrate-Vitamin D (CALCIUM + D PO) Take 600 mg by mouth Daily.    Smita Ibanez MD   latanoprost (XALATAN) 0.005 % ophthalmic solution  3/1/23   Smita Ibanez MD   methylcellulose, Laxative, (CITRUCEL) 500 MG tablet tablet Take 2 tablets by mouth Every 4 (Four) Hours As Needed.    Smita Ibanez MD   metoprolol succinate XL (TOPROL-XL) 25 MG 24 hr tablet  16   Smita Ibanez MD   Mirabegron ER (Myrbetriq) 50 MG tablet sustained-release 24 hour 24 hr tablet Take 50 mg by mouth Daily. 3/13/23   Maye Bronson APRN   montelukast (SINGULAIR) 10 MG tablet Take 1 tablet by mouth Every Night.    Smita Ibanez MD   Multiple Vitamins-Minerals (MULTIVITAMIN ADULT PO) Take 1 tablet by mouth Daily.    Smita Ibanez MD   timolol (TIMOPTIC) 0.5 % ophthalmic solution  21   Emergency, Nurse Radha RN       Allergies:  Penicillins    Social History:   Social History     Socioeconomic History    Marital status:    Tobacco Use    Smoking status: Never     Passive exposure: Never    Smokeless tobacco: Never   Vaping Use    Vaping Use: Never used   Substance and Sexual Activity    Alcohol use: No    Drug use: No    Sexual activity: Not Currently     Partners: Male     Birth control/protection: Hysterectomy       Family History:   Family History   Problem Relation Age of Onset    Melanoma Father   "   Hypertension Father     Stroke Father     Coronary artery disease Father     Heart disease Father     Breast cancer Mother     Hypertension Mother     Diabetes Mother     Coronary artery disease Mother     Cancer Mother         Breast cancer. Both breasts    Heart disease Mother     Stroke Maternal Grandmother          REVIEW OF SYSTEMS:    Complete review of systems was reviewed from admission H&P on date of admission, no interval changes.      Vitals:  /42 (BP Location: Right arm, Patient Position: Lying)   Pulse 75   Temp 98.1 °F (36.7 °C) (Oral)   Resp 16   Ht 154.9 cm (61\")   Wt 53.6 kg (118 lb 3.2 oz)   SpO2 92%   BMI 22.33 kg/m²   Temp (24hrs), Av.2 °F (36.8 °C), Min:97.9 °F (36.6 °C), Max:98.6 °F (37 °C)      I/O (24Hr):    Intake/Output Summary (Last 24 hours) at 2023 0708  Last data filed at 2023 2200  Gross per 24 hour   Intake --   Output 600 ml   Net -600 ml       Labs:  Lab Results (last 72 hours)       Procedure Component Value Units Date/Time    Basic Metabolic Panel [[id number]]  (Abnormal) Collected: 23    Specimen: Blood Updated: 23     Glucose 120 mg/dL      BUN 18 mg/dL      Creatinine 0.58 mg/dL      Sodium 138 mmol/L      Potassium 4.1 mmol/L      Chloride 102 mmol/L      CO2 25.0 mmol/L      Calcium 9.2 mg/dL      BUN/Creatinine Ratio 31.0     Anion Gap 11.0 mmol/L      eGFR 92.8 mL/min/1.73     Narrative:      GFR Normal >60  Chronic Kidney Disease <60  Kidney Failure <15    The GFR formula is only valid for adults with stable renal function between ages 18 and 70.    CBC (No Diff) [[id number]]  (Abnormal) Collected: 23    Specimen: Blood Updated: 23     WBC 10.68 10*3/mm3      RBC 4.29 10*6/mm3      Hemoglobin 12.4 g/dL      Hematocrit 38.5 %      MCV 89.7 fL      MCH 28.9 pg      MCHC 32.2 g/dL      RDW 12.3 %      RDW-SD 40.9 fl      MPV 12.2 fL      Platelets 145 10*3/mm3     Urinalysis With Culture If Indicated - " Straight Cath [[id number]]  (Normal) Collected: 05/31/23 2219    Specimen: Urine from Straight Cath Updated: 05/31/23 2238     Color, UA Yellow     Appearance, UA Clear     pH, UA 7.5     Specific Gravity, UA 1.009     Glucose, UA Negative     Ketones, UA Negative     Bilirubin, UA Negative     Blood, UA Negative     Protein, UA Negative     Leuk Esterase, UA Negative     Nitrite, UA Negative     Urobilinogen, UA 0.2 E.U./dL    Narrative:      In absence of clinical symptoms, the presence of pyuria, bacteria, and/or nitrites on the urinalysis result does not correlate with infection.  Urine microscopic not indicated.    Comprehensive Metabolic Panel [[id number]]  (Abnormal) Collected: 05/31/23 1904    Specimen: Blood Updated: 05/31/23 1932     Glucose 128 mg/dL      BUN 21 mg/dL      Creatinine 0.61 mg/dL      Sodium 140 mmol/L      Potassium 4.2 mmol/L      Chloride 103 mmol/L      CO2 26.0 mmol/L      Calcium 9.6 mg/dL      Total Protein 7.2 g/dL      Albumin 4.2 g/dL      ALT (SGPT) 27 U/L      AST (SGOT) 32 U/L      Alkaline Phosphatase 87 U/L      Total Bilirubin 0.5 mg/dL      Globulin 3.0 gm/dL      A/G Ratio 1.4 g/dL      BUN/Creatinine Ratio 34.4     Anion Gap 11.0 mmol/L      eGFR 91.6 mL/min/1.73     Narrative:      GFR Normal >60  Chronic Kidney Disease <60  Kidney Failure <15    The GFR formula is only valid for adults with stable renal function between ages 18 and 70.    CBC & Differential [[id number]]  (Abnormal) Collected: 05/31/23 1904    Specimen: Blood Updated: 05/31/23 1914    Narrative:      The following orders were created for panel order CBC & Differential.  Procedure                               Abnormality         Status                     ---------                               -----------         ------                     CBC Auto Differential[[id number]]        Abnormal            Final result                 Please view results for these tests on the individual orders.    CBC Auto  Differential [[id number]]  (Abnormal) Collected: 05/31/23 1904    Specimen: Blood Updated: 05/31/23 1914     WBC 14.38 10*3/mm3      RBC 4.54 10*6/mm3      Hemoglobin 13.1 g/dL      Hematocrit 40.9 %      MCV 90.1 fL      MCH 28.9 pg      MCHC 32.0 g/dL      RDW 12.2 %      RDW-SD 40.8 fl      MPV 12.2 fL      Platelets 148 10*3/mm3      Neutrophil % 84.7 %      Lymphocyte % 9.2 %      Monocyte % 5.1 %      Eosinophil % 0.3 %      Basophil % 0.1 %      Immature Grans % 0.6 %      Neutrophils, Absolute 12.17 10*3/mm3      Lymphocytes, Absolute 1.32 10*3/mm3      Monocytes, Absolute 0.74 10*3/mm3      Eosinophils, Absolute 0.04 10*3/mm3      Basophils, Absolute 0.02 10*3/mm3      Immature Grans, Absolute 0.09 10*3/mm3      nRBC 0.0 /100 WBC             Radiology:  Imaging Results (Last 72 Hours)       Procedure Component Value Units Date/Time    XR Hip With or Without Pelvis 2 - 3 View Left [[id number]] Collected: 05/31/23 1641     Updated: 05/31/23 1647    Narrative:      HISTORY: Injury after fall with left hip pain     Left hip: Frontal view the pelvis as well as frontal and crosstable left  views left hip are obtained.     There are fractures of the left superior ramus adjacent the pubic bone  and of the left inferior ramus with mild displacement. There is moderate  bilateral hip joint space narrowing. There is mild to moderate bony  spurring of the left femoral head at the junction with the femoral neck.  No proximal left femur fracture. SI joints remain intact.       Impression:      1. Mildly displaced acute left superior and inferior rami fractures.  2. Moderate left and mild arthritic changes of the hips. No proximal  femur fracture identified.  This report was finalized on 05/31/2023 16:44 by Dr. Lana Hayes MD.    XR Shoulder 2+ View Left [[id number]] Collected: 05/31/23 1638     Updated: 05/31/23 1642    Narrative:      HISTORY: Injury with fall     Left shoulder: 3 views of left shoulder are obtained.      Osteopenia. No fracture or dislocation. Mild glenohumeral joint space  narrowing. Visible left-sided ribs intact. No left apical pneumothorax.       Impression:      1. No acute osseous injury left shoulder.  This report was finalized on 05/31/2023 16:39 by Dr. Lana Hayes MD.    CT Cervical Spine Without Contrast [[id number]] Collected: 05/31/23 1603     Updated: 05/31/23 1611    Narrative:      CT CERVICAL SPINE WO CONTRAST- 5/31/2023 3:44 PM CDT     HISTORY: Injury/fall; fall at home after losing balance.     COMPARISON: None     DOSE LENGTH PRODUCT: 265 mGy cm. Automated exposure control was also  utilized to decrease patient radiation dose.     TECHNIQUE: Axial images of cervical spine obtained with sagittal coronal  reconstructions     FINDINGS:  Minimal retrolisthesis of C5 by less than 1 mm. Mild to  moderate diffuse degenerative disc change with moderate facet  arthropathy appearing greatest on the left at the C3/C4 level. Uncinate  process hypertrophy from C5 through C7. 5 mm sclerotic focus of the  right C1 lateral mass favoring incidental bony island.     No cervical vertebral fracture or traumatic malalignment identified.     Degenerative changes creating no significant central canal stenosis.  There is scattered bilateral foraminal narrowing appearing greatest on  the left at C5/C6, moderate in severity.     Mild carotid artery calcification. No apical pneumothorax.       Impression:      1. Mild to moderate degenerative disc change with moderate facet  arthropathy and uncinate process hypertrophy. No acute cervical  vertebral fracture or traumatic malalignment. No prominent central  cervical canal stenosis with scattered neural foramen narrowing,  greatest of the left at C5/C6.  This report was finalized on 05/31/2023 16:08 by Dr. Lana Hayes MD.    CT Head Without Contrast [[id number]] Collected: 05/31/23 1600     Updated: 05/31/23 1605    Narrative:      EXAMINATION: CT HEAD WO CONTRAST-  "5/31/2023 4:00 PM CDT     HISTORY: Fall, head injury. Headache.     DOSE: 523 mGycm (Automatic exposure control technique was implemented in  an effort to keep the radiation dose as low as possible without  compromising image quality)     REPORT: Axial CT of the head was performed without contrast,  reconstructed coronal and sagittal images are also reviewed.     Comparison: There are no correlative imaging studies for comparison.     No intracranial hemorrhage, mass or mass effect is identified. The  ventricles and basal cisterns are within normal limits. There is  slightly decreased attenuation in the periventricular white matter  tracts compatible with chronic small vessel white matter ischemic  disease. The extracranial structures appear within normal limits. No  fracture is identified. There is normal aeration of the visualized  paranasal sinuses and mastoid air cells.       Impression:      No acute intracranial abnormality.   This report was finalized on 05/31/2023 16:02 by Dr. Smith Suero MD.            Physical Exam:     PHYSICAL EXAM:      Physical Examination:  Vitals:   Vitals:    05/31/23 1839 05/31/23 2033 05/31/23 2332 06/01/23 0332   BP: 147/69  124/47 113/42   BP Location: Left arm  Right arm Right arm   Patient Position: Lying  Lying Lying   Pulse: 91  79 75   Resp: 16  16 16   Temp: 97.9 °F (36.6 °C)  98.1 °F (36.7 °C) 98.1 °F (36.7 °C)   TempSrc: Oral  Oral Oral   SpO2: 92%  92% 92%   Weight: 52.6 kg (116 lb) 53.6 kg (118 lb 3.2 oz)     Height: 154.9 cm (61\")        General:  Appears stated age, no distress.  Orientation:  Alert and oriented to time, place, and person.  HEENT: Head is normocephalic, atraumatic.  No gross visual or auditory acuity deficits.  Mood and Affect:  Cooperative and pleasant.  Gait:  Resting comfortably in bed.  Cardiovascular:  Symmetric 1-2 plus pulses in upper and lower extremities.  Sensation:  Grossly intact to light touch.  Coordination/balance:  " Normal  Musculoskeletal:  Right upper extremity exam:  There is no tenderness to palpation about the shoulder, elbow, wrist or hand.  Unrestricted full function motion is present.  Stability is normal with provocative tests, 5/5 strength, and skin is normal.      Left upper extremity exam:  There is no tenderness to palpation about the shoulder, elbow, wrist or hand.  Unrestricted full function motion is present.  Stability is normal with provocative tests, 5/5 strength, and skin is normal.     Right lower extremity exam:  There is no tenderness to palpation about the hip, knee, ankle or foot.  Unrestricted full function motion is present.  Stability is normal with provocative tests, 5/5 strength, and skin is normal.     Left lower extremity exam: No gross deformity about the left lower extremity.  No open skin wounds or lesions.  No significant tenderness to palpation about the lateral aspect of the left hip, thigh, knee, tibia/fibula, ankle or foot.  Gentle logroll was well-tolerated with minimal discomfort.  Unable to perform a straight leg raise.  No gross motor or sensory deficit to the left lower extremity.  Left leg is warm and perfused.    Radiology Review  My review of patient's x-rays shows the patient to have minimally displaced left superior and inferior pubic rami fractures.  Do not appreciate any posterior ring fracture on radiographs.    Assessment:     79 y/o F s/p fall with L LCI pelvic ring injury, bilateral shoulder pain, significant medical history for hypertension, hyperlipidemia, osteoporosis and mitral valve prolapse    Plan:   1.  Left-sided pelvic ring injury: Reviewed clinical and radiographic findings with her this morning.  Recommend conservative management with weightbearing as tolerated.  Would recommend PT/OT for mobilization.  Pain control.  May follow-up with orthopedics 2 weeks from injury.  We will follow peripherally.  Please call with questions or concerns.
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
## 2023-06-01 NOTE — PLAN OF CARE
"Goal Outcome Evaluation:  Plan of Care Reviewed With: patient        Progress: no change  Outcome Evaluation: Patient presented to evaluation AxOx4 with reports of 9/10 NPRS when moving. She was able to perform bed mobility with at most minimal assistance. Patient was able to respond to verbal cues well and showed appropriate motor planning/coordination. Upon standing and begining to mobilize, she became hypotensive. We were able to sit her in a chair and take her BP which was 66/33. Once returned to the bed with legs elevated her blood pressure returned to 130/64. Nursing was notified to assist patient with voiding via bed pan. Additionally, the patient denies any changes in sensation or visual changes besides \"my floater has returned\". PT recommends further intervention to promote a normal hemodynamic response to exercise and increase ambulation in anticipation for discharge. PT recommends discharge to home with assist.         "

## 2023-06-01 NOTE — PROGRESS NOTES
[facility] Medicine Services  INPATIENT PROGRESS NOTE    Patient Name: Michel Cruz  Date of Admission: 5/31/2023  Today's Date: 06/01/23  Length of Stay: 0  Primary Care Physician: Isrrael Wren MD    Subjective   Chief Complaint: Fall with left hip and pelvic pain  HPI   Ms Cruz presented to [facility] emergency room 5/31/2023 after a fall while trying to reach salt from an upper cabinet.  She was reaching for the salt, fell backwards and landed on her left shoulder and left hip.  She did hit her head but did not loose consciousness.  She was unable to get up and ambulate without acute pain.  X-ray pelvis revealed mildly displaced acute left superior and inferior rami fractures.  X-ray left shoulder no acute injury.  CT cervical spine mild to moderate degenerative disc change with moderate facet arthropathy and uncinate osseous hypertrophy.  No acute cervical vertebral facture or traumatic malalignment. CT head no acute intracranial abnormality.  Morphine and Dilaudid given in ER.    She was seen earlier this morning after an episode of low blood pressure.  Occupational Therapy was working with patient and after initiating walking she became nauseated and diaphoretic and sat down in a chair.  Blood pressure 62/33 and she was assisted back to bed.  Legs elevated blood pressure improved 103/47.  At the time of my assessment patient denied nausea, vomiting or abdominal pain.  No complaints of chest pain or palpitations.  Orthostatic blood pressure readings obtained without significant change.  She has been and feels better.  She hopes to be able to return home if she continues to work with physical therapy and pain improved.  We will add Lidoderm patch.    Review of Systems   Constitutional:  Positive for activity change (After fall). Negative for chills and fever.   HENT:  Positive for congestion. Negative for trouble swallowing.    Eyes:  Negative for  photophobia and visual disturbance.   Respiratory:  Negative for cough, shortness of breath and wheezing.    Cardiovascular:  Negative for chest pain, palpitations and leg swelling.   Gastrointestinal:  Negative for diarrhea, nausea and vomiting.   Endocrine: Negative for cold intolerance, heat intolerance and polyuria.   Genitourinary:  Negative for dysuria, frequency and urgency.   Musculoskeletal:  Positive for back pain (Left lower back).   Skin:  Negative for color change, pallor, rash and wound.   Allergic/Immunologic: Negative for immunocompromised state.   Neurological:  Positive for weakness. Negative for light-headedness.   Hematological:  Negative for adenopathy. Does not bruise/bleed easily.   Psychiatric/Behavioral:  Negative for agitation, behavioral problems and confusion.       All pertinent negatives and positives are as above. All other systems have been reviewed and are negative unless otherwise stated.     Objective    Temp:  [97.9 °F (36.6 °C)-98.6 °F (37 °C)] 98.1 °F (36.7 °C)  Heart Rate:  [75-91] 79  Resp:  [16-18] 16  BP: (111-151)/(31-69) 111/52  Physical Exam  Vitals and nursing note reviewed.   Constitutional:       Comments: Sitting up in bed.  No oxygen in use.  No visitors in room.   HENT:      Head: Normocephalic and atraumatic.      Nose: No congestion.      Mouth/Throat:      Pharynx: Oropharynx is clear. No oropharyngeal exudate or posterior oropharyngeal erythema.   Eyes:      Extraocular Movements: Extraocular movements intact.      Pupils: Pupils are equal, round, and reactive to light.   Cardiovascular:      Rate and Rhythm: Normal rate and regular rhythm.      Heart sounds: No murmur heard.  Pulmonary:      Breath sounds: No wheezing, rhonchi or rales.      Comments: No oxygen in use.  Abdominal:      Palpations: Abdomen is soft.      Tenderness: There is no abdominal tenderness.   Genitourinary:     Comments: WIC in place.  Musculoskeletal:         General: No swelling or  tenderness (Left posterior low back, left shoulder).      Cervical back: Normal range of motion and neck supple.      Comments: SCDs bilateral lower extremities.   Skin:     General: Skin is warm and dry.   Neurological:      General: No focal deficit present.      Mental Status: She is alert and oriented to person, place, and time.   Psychiatric:         Mood and Affect: Mood normal.         Behavior: Behavior normal.         Thought Content: Thought content normal.         Judgment: Judgment normal.     Results Review:  I have reviewed the labs, radiology results, and diagnostic studies.    Laboratory Data:   Results from last 7 days   Lab Units 06/01/23  0422 05/31/23  1904   WBC 10*3/mm3 10.68 14.38*   HEMOGLOBIN g/dL 12.4 13.1   HEMATOCRIT % 38.5 40.9   PLATELETS 10*3/mm3 145 148     Results from last 7 days   Lab Units 06/01/23  0422 05/31/23  1904   SODIUM mmol/L 138 140   POTASSIUM mmol/L 4.1 4.2   CHLORIDE mmol/L 102 103   CO2 mmol/L 25.0 26.0   BUN mg/dL 18 21   CREATININE mg/dL 0.58 0.61   CALCIUM mg/dL 9.2 9.6   BILIRUBIN mg/dL  --  0.5   ALK PHOS U/L  --  87   ALT (SGPT) U/L  --  27   AST (SGOT) U/L  --  32   GLUCOSE mg/dL 120* 128*     Culture Data:   No results found for: BLOODCX, URINECX, WOUNDCX, MRSACX, RESPCX, STOOLCX    Radiology Data:   Imaging Results (Last 24 Hours)       Procedure Component Value Units Date/Time    XR Hip With or Without Pelvis 2 - 3 View Left [[id number]] Collected: 05/31/23 1641     Updated: 05/31/23 1647    Narrative:      HISTORY: Injury after fall with left hip pain     Left hip: Frontal view the pelvis as well as frontal and crosstable left  views left hip are obtained.     There are fractures of the left superior ramus adjacent the pubic bone  and of the left inferior ramus with mild displacement. There is moderate  bilateral hip joint space narrowing. There is mild to moderate bony  spurring of the left femoral head at the junction with the femoral neck.  No proximal  left femur fracture. SI joints remain intact.       Impression:      1. Mildly displaced acute left superior and inferior rami fractures.  2. Moderate left and mild arthritic changes of the hips. No proximal  femur fracture identified.  This report was finalized on 05/31/2023 16:44 by Dr. Lana Hayes MD.    XR Shoulder 2+ View Left [[id number]] Collected: 05/31/23 1638     Updated: 05/31/23 1642    Narrative:      HISTORY: Injury with fall     Left shoulder: 3 views of left shoulder are obtained.     Osteopenia. No fracture or dislocation. Mild glenohumeral joint space  narrowing. Visible left-sided ribs intact. No left apical pneumothorax.       Impression:      1. No acute osseous injury left shoulder.  This report was finalized on 05/31/2023 16:39 by Dr. Lana Hayes MD.    CT Cervical Spine Without Contrast [[id number]] Collected: 05/31/23 1603     Updated: 05/31/23 1611    Narrative:      CT CERVICAL SPINE WO CONTRAST- 5/31/2023 3:44 PM CDT     HISTORY: Injury/fall; fall at home after losing balance.     COMPARISON: None     DOSE LENGTH PRODUCT: 265 mGy cm. Automated exposure control was also  utilized to decrease patient radiation dose.     TECHNIQUE: Axial images of cervical spine obtained with sagittal coronal  reconstructions     FINDINGS:  Minimal retrolisthesis of C5 by less than 1 mm. Mild to  moderate diffuse degenerative disc change with moderate facet  arthropathy appearing greatest on the left at the C3/C4 level. Uncinate  process hypertrophy from C5 through C7. 5 mm sclerotic focus of the  right C1 lateral mass favoring incidental bony island.     No cervical vertebral fracture or traumatic malalignment identified.     Degenerative changes creating no significant central canal stenosis.  There is scattered bilateral foraminal narrowing appearing greatest on  the left at C5/C6, moderate in severity.     Mild carotid artery calcification. No apical pneumothorax.       Impression:      1. Mild to  moderate degenerative disc change with moderate facet  arthropathy and uncinate process hypertrophy. No acute cervical  vertebral fracture or traumatic malalignment. No prominent central  cervical canal stenosis with scattered neural foramen narrowing,  greatest of the left at C5/C6.  This report was finalized on 05/31/2023 16:08 by Dr. Lana Hayes MD.    CT Head Without Contrast [[id number]] Collected: 05/31/23 1600     Updated: 05/31/23 1605    Narrative:      EXAMINATION: CT HEAD WO CONTRAST- 5/31/2023 4:00 PM CDT     HISTORY: Fall, head injury. Headache.     DOSE: 523 mGycm (Automatic exposure control technique was implemented in  an effort to keep the radiation dose as low as possible without  compromising image quality)     REPORT: Axial CT of the head was performed without contrast,  reconstructed coronal and sagittal images are also reviewed.     Comparison: There are no correlative imaging studies for comparison.     No intracranial hemorrhage, mass or mass effect is identified. The  ventricles and basal cisterns are within normal limits. There is  slightly decreased attenuation in the periventricular white matter  tracts compatible with chronic small vessel white matter ischemic  disease. The extracranial structures appear within normal limits. No  fracture is identified. There is normal aeration of the visualized  paranasal sinuses and mastoid air cells.       Impression:      No acute intracranial abnormality.   This report was finalized on 05/31/2023 16:02 by Dr. Smith Suero MD.          Scheduled medications:  latanoprost, 1 drop, Both Eyes, Nightly  lidocaine, 1 patch, Transdermal, Q24H  metoprolol succinate XL, 25 mg, Oral, Q24H  Morphine, 4 mg, Intravenous, Once  sodium chloride, 10 mL, Intravenous, Q12H  timolol, 1 drop, Both Eyes, Q12H       I have reviewed the patient's current medications.     Assessment/Plan   Assessment  Active Hospital Problems    Diagnosis    • **Closed fracture of  multiple pubic rami, left, initial encounter    • Acute pain due to injury    • Essential hypertension    • Fall with injury      Treatment Plan  Acute displaced left superior and inferior rami fractures.  Patient fell while attempting to get saltshaker out of upper cabinet.  She was unable to ambulate without pain.  Orthopedics consulted and Dr. Davis recommends conservative management with weightbearing as tolerated.  Physical therapy and Occupational Therapy for mobilization.  Follow-up with Dr. Davis in 2 weeks.    Acute left posterior low back pain secondary to fall.  Add Lidoderm patch.  Percocet for severe pain and moderate pain.Tylenol for mild pain.  Zofran as needed for nausea.    Acute fall.  Physical therapy and Occupational Therapy consulted.  Weightbearing as tolerated.    Primary hypertension.  Blood pressure 111/52.  Patient had episode of low blood pressure 62/33 this morning when working with physical therapy.  After initiating ambulation she became nauseated and diaphoretic.  She was assisted back to bed with feet elevated and blood pressure improved 103/47, 130/64.  Orthostatic blood pressure readings obtained 111/31 lying, 120/47 sitting, 127/53 standing.  IV fluids ordered at 100 mL/h.    SCDs for deep vein thrombosis prophylaxis.    Social service consulted for discharge planning.  Patient hopes to return home with physical therapy if ambulatory.    Medical Decision Making  Number and Complexity of problems: 4  Acute displaced left superior and inferior Rami fractures: Acute, high complexity.  Acute low posterior back pain: Acute, high complexity  Acute fall, acute, moderate complexity  Primary hypertension:, Chronic, moderate complexity    Differential Diagnosis: None    Conditions and Status        Condition is unchanged.     [facility] Data  External documents reviewed: Urology office visits 3/13/2023  Cardiac tracing (EKG, telemetry) interpretation: None  Radiology interpretation: CT cervical  spine, CT head, hip x-ray, left shoulder x-ray reviewed radiology interpretation.  Labs reviewed:   BMP 6/1/2023.  Repeat BMP in AM.  CBC 6/1/2023.    Any tests that were considered but not ordered: None     Decision rules/scores evaluated (example [id number], [place], etc): None     Discussed with: Kimber and patient.     Care Planning  Shared decision making: Dr. Quiroga and patient.  Patient agrees to physical therapy, nonoperative treatment per Dr. Davis.  Follow-up with Dr. Davis in 2 weeks.   Code status and discussions: Full code  Patient surrogate decision maker is her , Smith Gudino  Social Determinants of Health that impact treatment or disposition: None  I expect the patient to be discharged to home with physical therapy in 1-2 days.     Electronically signed by JOANNE Leroy, 06/01/23, 13:09 CDT.     This visit was performed by both a physician and an APC. I personally evaluated and examined the patient. I performed all aspects of the MDM as documented.    Discussed with her nurse, Maria Isabel.    Orthostatic earlier today.  I increased her fluids and ordered orthostatic vital signs.    Making efforts at pain control.  Looks more comfortable than she did last night.    Continue therapy efforts.    She would ideally like to go home with assistance from her  and home health.  Occupational Therapy seems okay with this and we are awaiting PT notes.    Electronically signed by Wilmar Quiroga DO, 6/1/2023, 14:38 CDT.

## 2023-06-02 VITALS
TEMPERATURE: 99.2 F | BODY MASS INDEX: 22.31 KG/M2 | OXYGEN SATURATION: 93 % | RESPIRATION RATE: 16 BRPM | WEIGHT: 118.2 LBS | DIASTOLIC BLOOD PRESSURE: 55 MMHG | SYSTOLIC BLOOD PRESSURE: 131 MMHG | HEIGHT: 61 IN | HEART RATE: 82 BPM

## 2023-06-02 LAB
ANION GAP SERPL CALCULATED.3IONS-SCNC: 9 MMOL/L (ref 5–15)
BUN SERPL-MCNC: 10 MG/DL (ref 8–23)
BUN/CREAT SERPL: 17.5 (ref 7–25)
CALCIUM SPEC-SCNC: 8.4 MG/DL (ref 8.6–10.5)
CHLORIDE SERPL-SCNC: 105 MMOL/L (ref 98–107)
CO2 SERPL-SCNC: 23 MMOL/L (ref 22–29)
CREAT SERPL-MCNC: 0.57 MG/DL (ref 0.57–1)
EGFRCR SERPLBLD CKD-EPI 2021: 93.2 ML/MIN/1.73
GLUCOSE SERPL-MCNC: 113 MG/DL (ref 65–99)
POTASSIUM SERPL-SCNC: 3.9 MMOL/L (ref 3.5–5.2)
SODIUM SERPL-SCNC: 137 MMOL/L (ref 136–145)

## 2023-06-02 PROCEDURE — 97530 THERAPEUTIC ACTIVITIES: CPT

## 2023-06-02 PROCEDURE — 97535 SELF CARE MNGMENT TRAINING: CPT

## 2023-06-02 PROCEDURE — 96361 HYDRATE IV INFUSION ADD-ON: CPT

## 2023-06-02 PROCEDURE — 97116 GAIT TRAINING THERAPY: CPT

## 2023-06-02 PROCEDURE — G0378 HOSPITAL OBSERVATION PER HR: HCPCS

## 2023-06-02 PROCEDURE — 80048 BASIC METABOLIC PNL TOTAL CA: CPT | Performed by: NURSE PRACTITIONER

## 2023-06-02 RX ORDER — OXYCODONE AND ACETAMINOPHEN 7.5; 325 MG/1; MG/1
1 TABLET ORAL EVERY 8 HOURS PRN
Qty: 9 TABLET | Refills: 0 | Status: SHIPPED | OUTPATIENT
Start: 2023-06-02 | End: 2023-06-07

## 2023-06-02 RX ORDER — LIDOCAINE 50 MG/G
1 PATCH TOPICAL
Qty: 30 PATCH | Refills: 0 | Status: SHIPPED | OUTPATIENT
Start: 2023-06-03

## 2023-06-02 RX ADMIN — TIMOLOL MALEATE 1 DROP: 5 SOLUTION/ DROPS OPHTHALMIC at 08:13

## 2023-06-02 RX ADMIN — SODIUM CHLORIDE 100 ML/HR: 9 INJECTION, SOLUTION INTRAVENOUS at 07:21

## 2023-06-02 RX ADMIN — LIDOCAINE 1 PATCH: 700 PATCH TOPICAL at 08:13

## 2023-06-02 RX ADMIN — Medication 10 ML: at 08:13

## 2023-06-02 RX ADMIN — METOPROLOL SUCCINATE 25 MG: 25 TABLET, EXTENDED RELEASE ORAL at 08:13

## 2023-06-02 RX ADMIN — OXYCODONE AND ACETAMINOPHEN 1 TABLET: 7.5; 325 TABLET ORAL at 08:13

## 2023-06-02 NOTE — NURSING NOTE
Spoke with patient regarding referral for home health services.  Patient is agreeable to services and confirms address and phone number on file is correct.

## 2023-06-02 NOTE — PLAN OF CARE
Goal Outcome Evaluation:  Plan of Care Reviewed With: patient        Progress: improving  Outcome Evaluation: CGA in room <> BR and around room via RW. Increased cues for technique required and keeping body inside RW. Pt BP stable this date with all activity. Toilets s/u and CGA-SBA. Grooming tasks standing sink side SBA-CGA. Dons brief with s/u and cues increased time needed. Pt is improving and discussed home ADLs/adaptations for home. Recommend RW use at home and HH/assist

## 2023-06-02 NOTE — THERAPY TREATMENT NOTE
"Patient Name: Michel Cruz  : 1945    MRN: [id number]                              Today's Date: 2023       Admit Date: 2023    Visit Dx: Therapist utilized gait belt, applied non-slipped socks, provided fall risk education/prevention, & facilitated muscle strengthening PRN to reduce patient falls risk during this session.      ICD-10-CM ICD-9-CM   1. Closed fracture of multiple pubic rami, left, initial encounter  S32.592A 808.2   2. Fall, initial encounter  W19.XXXA E888.9   3. Impaired mobility [Z74.09 (ICD-10-CM)]  Z74.09 799.89     Patient Active Problem List   Diagnosis    Screening mammogram for high-risk patient    Closed fracture of multiple pubic rami, left, initial encounter    Acute pain due to injury    Essential hypertension    Fall with injury     Past Medical History:   Diagnosis Date    Essential hypertension 2023    History of tachycardia     Hyperlipidemia     Mitral valve prolapse     Osteoporosis     Urinary incontinence     Have trouble holding when need to empty bladder     Past Surgical History:   Procedure Laterality Date    BREAST BIOPSY       SECTION      CHOLECYSTECTOMY      EYE SURGERY      GANGLION CYST EXCISION      x2    HYSTERECTOMY      TONSILLECTOMY        General Information       Row Name 23 0829          OT Time and Intention    Document Type therapy note (daily note)  -MT     Mode of Treatment occupational therapy  -MT       Row Name 23 0829          General Information    Patient Profile Reviewed yes  -MT     Existing Precautions/Restrictions fall  /50 supine, sit 132/51 stand 111/47 and pt c/o \"a little whoozy\"  -MT       Row Name 23 0829          Cognition    Orientation Status (Cognition) oriented x 4  -MT       Row Name 23 0829          Safety Issues, Functional Mobility    Impairments Affecting Function (Mobility) balance;endurance/activity tolerance;pain  -MT               User Key  (r) = Recorded By, (t) " = Taken By, (c) = Cosigned By      Initials Name Provider Type    MT Jenifer Andrews COTA Occupational Therapist Assistant                     Mobility/ADL's       Row Name 06/02/23 0829          Bed Mobility    Supine-Sit [place] (Bed Mobility) minimum assist (75% patient effort)  -MT     Comment, (Bed Mobility) Kwasi to take weight off of LLE pt was able to move it EOB  -MT       Row Name 06/02/23 0829          Transfers    Transfers sit-stand transfer;stand-sit transfer;toilet transfer  -MT       Row Name 06/02/23 0829          Sit-Stand Transfer    Sit-Stand [place] (Transfers) contact guard;standby assist  -MT     Assistive Device (Sit-Stand Transfers) walker, front-wheeled  -MT       Row Name 06/02/23 0829          Stand-Sit Transfer    Stand-Sit [place] (Transfers) standby assist  -MT     Assistive Device (Stand-Sit Transfers) walker, front-wheeled  -MT       Row Name 06/02/23 0829          Toilet Transfer    Type (Toilet Transfer) sit-stand;stand-sit  -MT     [place] Level (Toilet Transfer) standby assist;contact guard  -MT     Assistive Device (Toilet Transfer) walker, front-wheeled  -MT       Row Name 06/02/23 0829          Functional Mobility    Functional Mobility- Ind. Level contact guard assist  -MT     Functional Mobility- Device walker, front-wheeled  -MT     Functional Mobility- Comment CGA in room <> BR and around room via RW. Increased cues for technique required and keeping body inside RW  -MT       Row Name 06/02/23 0829          Mobility    Extremity Weight-bearing Status left lower extremity  -MT     Left Lower Extremity (Weight-bearing Status) weight-bearing as tolerated (WBAT)  -MT       Row Name 06/02/23 0829          Lower Body Dressing Assessment/Training    [place] Level (Lower Body Dressing) set up  -MT     Position (Lower Body Dressing) edge of bed sitting  -MT               User Key  (r) = Recorded By, (t) = Taken By, (c) = Cosigned By      Initials Name  Provider Type    Jenifer Ellis COTA Occupational Therapist Assistant                   Obj/Interventions    No documentation.                  Goals/Plan    No documentation.                  Clinical Impression       Row Name 06/02/23 0829          Pain Assessment    Pretreatment Pain Rating 7/10  -MT     Posttreatment Pain Rating 7/10  -MT     Pain Location - Side/Orientation Left  -MT     Pain Location lower  -MT     Pain Location - extremity  -MT       Row Name 06/02/23 0829          Therapy Plan Review/Discharge Plan (OT)    Anticipated Discharge Disposition (OT) home with assist;home with home health  -MT       Row Name 06/02/23 0829          Positioning and Restraints    Pre-Treatment Position in bed  -MT     Post Treatment Position chair  -MT     In Chair sitting;call light within reach;encouraged to call for assist  pt did not have recliner located and assisted pt bed> chair  -MT               User Key  (r) = Recorded By, (t) = Taken By, (c) = Cosigned By      Initials Name Provider Type    Jenifer Ellis COTA Occupational Therapist Assistant                   Outcome Measures       Row Name 06/02/23 0829          How much help from another is currently needed...    Putting on and taking off regular lower body clothing? 3  -MT     Bathing (including washing, rinsing, and drying) 3  -MT     Toileting (which includes using toilet bed pan or urinal) 3  -MT     Putting on and taking off regular upper body clothing 4  -MT     Taking care of personal grooming (such as brushing teeth) 4  -MT     Eating meals 4  -MT     AM-PAC 6 Clicks Score (OT) 21  -MT       Row Name 06/02/23 0813          How much help from another person do you currently need...    Turning from your back to your side while in flat bed without using bedrails? 3  -CK     Moving from lying on back to sitting on the side of a flat bed without bedrails? 3  -CK     Moving to and from a bed to a chair (including a wheelchair)? 3  -CK      Standing up from a chair using your arms (e.g., wheelchair, bedside chair)? 3  -CK     Climbing 3-5 steps with a railing? 2  -CK     To walk in hospital room? 3  -CK     AM-PAC 6 Clicks Score (PT) 17  -CK     Highest level of mobility 5 --> Static standing  -CK               User Key  (r) = Recorded By, (t) = Taken By, (c) = Cosigned By      Initials Name Provider Type    Jenifer Ellis COTA Occupational Therapist Assistant    CK Yvonne Reese, RN Registered Nurse                      OT Recommendation and Plan     Plan of Care Review  Plan of Care Reviewed With: patient  Progress: improving  Outcome Evaluation: CGA in room <> BR and around room via RW. Increased cues for technique required and keeping body inside RW. Pt BP stable this date with all activity. Toilets s/u and CGA-SBA. Grooming tasks standing sink side SBA-CGA. Dons brief with s/u and cues increased time needed. Pt is improving and discussed home ADLs/adaptations for home. Recommend RW use at home and HH/assist     Time Calculation:    Time Calculation- OT       Row Name 06/02/23 0829             Time Calculation- OT    OT Start Time 0829  -MT      OT Stop Time 0940  -MT      OT Time Calculation (min) 71 min  -MT      Total Timed Code Minutes- OT 71 minute(s)  -MT      OT Received On 06/02/23  -MT         Timed Charges    57563 - OT Self Care/Mgmt Minutes 71  -MT         Total Minutes    Timed Charges Total Minutes 71  -MT       Total Minutes 71  -MT                User Key  (r) = Recorded By, (t) = Taken By, (c) = Cosigned By      Initials Name Provider Type    Jenifer Ellis COTA Occupational Therapist Assistant                  Therapy Charges for Today       Code Description Service Date Service Provider Modifiers Qty    [id number] HC OT SELF CARE/MGMT/TRAIN EA 15 MIN 6/2/2023 Jenifer Andrews COTA GO 5                 MICHAEL Siddiqui  6/2/2023

## 2023-06-02 NOTE — THERAPY TREATMENT NOTE
Acute Care - Physical Therapy Treatment Note  [facility]     Patient Name: Michel Cruz  : 1945  MRN: [id number]  Today's Date: 2023      Visit Dx:     ICD-10-CM ICD-9-CM   1. Closed fracture of multiple pubic rami, left, initial encounter  S32.592A 808.2   2. Fall, initial encounter  W19.XXXA E888.9   3. Impaired mobility [Z74.09 (ICD-10-CM)]  Z74.09 799.89     Patient Active Problem List   Diagnosis    Screening mammogram for high-risk patient    Closed fracture of multiple pubic rami, left, initial encounter    Acute pain due to injury    Essential hypertension    Fall with injury     Past Medical History:   Diagnosis Date    Essential hypertension 2023    History of tachycardia     Hyperlipidemia     Mitral valve prolapse     Osteoporosis     Urinary incontinence     Have trouble holding when need to empty bladder     Past Surgical History:   Procedure Laterality Date    BREAST BIOPSY       SECTION      CHOLECYSTECTOMY      EYE SURGERY      GANGLION CYST EXCISION      x2    HYSTERECTOMY      TONSILLECTOMY       PT Assessment (last 12 hours)       PT Evaluation and Treatment       Row Name 23 1326          Physical Therapy Time and Intention    Subjective Information complains of;pain  -NW     Document Type therapy note (daily note)  -NW     Mode of Treatment physical therapy  -NW       Row Name 23 1326          General Information    Existing Precautions/Restrictions fall  -NW       Row Name 23 1326          Pain    Pretreatment Pain Rating 8/10  -     Pain Location - Side/Orientation Left  -NW     Pain Location - groin  -NW       Row Name 23 1326          Bed Mobility    Comment, (Bed Mobility) chair  -NW       Row Name 23 1326          Sit-Stand Transfer    Sit-Stand [place] (Transfers) verbal cues;contact guard  -NW     Assistive Device (Sit-Stand Transfers) walker, front-wheeled  -NW       Row Name 23 1326          Stand-Sit Transfer     Stand-Sit [place] (Transfers) verbal cues;supervision  -NW     Assistive Device (Stand-Sit Transfers) walker, front-wheeled  -NW       Row Name 06/02/23 1326          Toilet Transfer    [place] Level (Toilet Transfer) verbal cues;contact guard  -NW       Row Name 06/02/23 1326          Gait/Stairs (Locomotion)    [place] Level (Gait) verbal cues;contact guard  -NW     Assistive Device (Gait) walker, front-wheeled  -NW     Distance in Feet (Gait) 15x2  -NW     Pattern (Gait) step-to  -NW     Deviations/Abnormal Patterns (Gait) joesph decreased;stride length decreased  -NW     [place] Level (Stairs) verbal cues;contact guard  -NW     Assistive Device (Stairs) walker, front-wheeled  -NW     Handrail Location (Stairs) left side (ascending)  -NW     Number of Steps (Stairs) 3  -NW     Ascending Technique (Stairs) step-to-step  -NW     Descending Technique (Stairs) step-to-step  -NW       Row Name 06/02/23 1326          Safety Issues, Functional Mobility    Impairments Affecting Function (Mobility) pain;strength  -NW       Row Name 06/02/23 1326          Positioning and Restraints    Pre-Treatment Position sitting in chair/recliner  -NW     Post Treatment Position chair  -NW     In Chair reclined;call light within reach;encouraged to call for assist  -NW               User Key  (r) = Recorded By, (t) = Taken By, (c) = Cosigned By      Initials Name Provider Type    Diana Owens, PTA Physical Therapist Assistant                    Physical Therapy Education       Title: PT OT SLP Therapies (Done)       Topic: Physical Therapy (Done)       Point: Mobility training (Done)       Learning Progress Summary             Patient Acceptance, E, VU by SHARMILA at 6/1/2023 0731    Comment: Patient was educated on PT role in care, repositioning, and safe ambulation with a walker.                         Point: Body mechanics (Done)       Learning Progress Summary             Patient Acceptance, E, VU by SHARMILA at  6/1/2023 0731    Comment: Patient was educated on PT role in care, repositioning, and safe ambulation with a walker.                                         User Key       Initials Effective Dates Name Provider Type Discipline     04/27/23 -  Levar Bosch, PT Student PT Student PT                  PT Recommendation and Plan             Time Calculation:    PT Charges       Row Name 06/02/23 1413             Time Calculation    Start Time 1326  -NW      Stop Time 1413  -NW      Time Calculation (min) 47 min  -NW      PT Received On 06/02/23  -NW      PT Goal Re-Cert Due Date 06/11/23  -NW         Time Calculation- PT    Total Timed Code Minutes- PT 47 minute(s)  -NW         Timed Charges    13689 - Gait Training Minutes  18  -NW      23121 - PT Therapeutic Activity Minutes 29  -NW         Total Minutes    Timed Charges Total Minutes 47  -NW       Total Minutes 47  -NW                User Key  (r) = Recorded By, (t) = Taken By, (c) = Cosigned By      Initials Name Provider Type    NW Diana Pleitez PTA Physical Therapist Assistant                  Therapy Charges for Today       Code Description Service Date Service Provider Modifiers Qty    98265909587 HC PT THERAPEUTIC ACT EA 15 MIN 6/2/2023 Diana Pleitez PTA GP 2    78101958119 HC GAIT TRAINING EA 15 MIN 6/2/2023 Diana Pleitez PTA GP 1            PT G-Codes  Outcome Measure Options: AM-PAC 6 Clicks Basic Mobility (PT)  AM-PAC 6 Clicks Score (PT): 17  AM-PAC 6 Clicks Score (OT): 21    Diana Pleitez PTA  6/2/2023

## 2023-06-02 NOTE — CASE MANAGEMENT/SOCIAL WORK
Continued Stay Note   [place]     Patient Name: Michel Cruz  MRN: [id number]  Today's Date: 6/2/2023    Admit Date: 5/31/2023        Discharge Plan       Row Name 06/02/23 0910       Plan    Final Discharge Disposition Code 06 - home with home health care    Final Note Pt is being dcd home today. Orders for HH received and pt prefers [facility]. [facility]                   Discharge Codes    No documentation.                 Expected Discharge Date and Time       Expected Discharge Date Expected Discharge Time    Jun 2, 2023               DAV Santiago

## 2023-06-02 NOTE — THERAPY DISCHARGE NOTE
Acute Care - Physical Therapy Discharge Summary  [facility]       Patient Name: Michel Cruz  : 1945  MRN: [id number]    Today's Date: 2023                 Admit Date: 2023      PT Recommendation and Plan    Visit Dx:    ICD-10-CM ICD-9-CM   1. Closed fracture of multiple pubic rami, left, initial encounter  S32.592A 808.2   2. Fall, initial encounter  W19.XXXA E888.9   3. Impaired mobility [Z74.09 (ICD-10-CM)]  Z74.09 799.89            PT Charges       Row Name 23 1413             Time Calculation    Start Time 1326  -NW      Stop Time 1413  -NW      Time Calculation (min) 47 min  -NW      PT Received On 23  -NW      PT Goal Re-Cert Due Date 23  -NW         Time Calculation- PT    Total Timed Code Minutes- PT 47 minute(s)  -NW         Timed Charges    16449 - Gait Training Minutes  18  -NW      65674 - PT Therapeutic Activity Minutes 29  -NW         Total Minutes    Timed Charges Total Minutes 47  -NW       Total Minutes 47  -NW                User Key  (r) = Recorded By, (t) = Taken By, (c) = Cosigned By      Initials Name Provider Type    NW Diana Pleitez, PTA Physical Therapist Assistant                     PT Rehab Goals       Row Name 23 1615             Bed Mobility Goal 1 (PT)    Activity/Assistive Device (Bed Mobility Goal 1, PT) bridging;rolling to left;rolling to right;scooting;sit to supine;supine to sit  -BECK      [place] Level/Cues Needed (Bed Mobility Goal 1, PT) standby assist  -BECK      Time Frame (Bed Mobility Goal 1, PT) long term goal (LTG)  -BECK      Progress/Outcomes (Bed Mobility Goal 1, PT) goal not met  -BECK         Transfer Goal 1 (PT)    Activity/Assistive Device (Transfer Goal 1, PT) sit-to-stand/stand-to-sit;bed-to-chair/chair-to-bed;toilet  -BECK      [place] Level/Cues Needed (Transfer Goal 1, PT) standby assist  -BECK      Time Frame (Transfer Goal 1, PT) long term goal (LTG)  -BECK      Progress/Outcome (Transfer Goal 1, PT) goal not met   -BECK         Gait Training Goal 1 (PT)    Activity/Assistive Device (Gait Training Goal 1, PT) gait (walking locomotion);assistive device use;walker, rolling;decrease fall risk;improve balance and speed;increase endurance/gait distance  -BECK      [place] Level (Gait Training Goal 1, PT) standby assist  -BECK      Distance (Gait Training Goal 1, PT) 50ft with normal hemodynamic response to exercise  -BECK      Time Frame (Gait Training Goal 1, PT) long term goal (LTG)  -BECK      Progress/Outcome (Gait Training Goal 1, PT) goal not met  -BECK         Stairs Goal 1 (PT)    Activity/Assistive Device (Stairs Goal 1, PT) ascending stairs;descending stairs  -BECK      [place] Level/Cues Needed (Stairs Goal 1, PT) contact guard required  -BECK      Number of Stairs (Stairs Goal 1, PT) 4  -BECK      Time Frame (Stairs Goal 1, PT) long term goal (LTG)  -BECK      Progress/Outcome (Stairs Goal 1, PT) goal not met  -BECK                User Key  (r) = Recorded By, (t) = Taken By, (c) = Cosigned By      Initials Name Provider Type Discipline    Austin Colvin, PTA Physical Therapist Assistant PT                        PT Discharge Summary  Anticipated Discharge Disposition (PT): home with assist  Reason for Discharge: Discharge from facility  Outcomes Achieved: Refer to plan of care for updates on goals achieved  Discharge Destination: Home with assist      Austin Nunes PTA   6/2/2023

## 2023-06-02 NOTE — PLAN OF CARE
Goal Outcome Evaluation:  Plan of Care Reviewed With: patient        Progress: improving  Outcome Evaluation: A&Ox4. VSS. Denies need for pain meds at this time. No c/o n/t. PPP. Voiding per dorcas. Resting well.

## 2023-06-02 NOTE — DISCHARGE SUMMARY
[facility] Medicine Services  DISCHARGE SUMMARY     Date of Admission: 5/31/2023  Date of Discharge:  6/2/2023  Primary Care Physician: Isrrael Wren MD    Presenting Problem/History of Present Illness:  Fall with left hip and pelvic pain    Final Discharge Diagnoses:  Active Hospital Problems    Diagnosis    • **Closed fracture of multiple pubic rami, left, initial encounter    • Acute pain due to injury    • Essential hypertension    • Fall with injury      Consults: Dr. Davis, orthopedics    Procedures Performed: None    Pertinent Test Results:     Imaging Results (All)       Procedure Component Value Units Date/Time    XR Hip With or Without Pelvis 2 - 3 View Left [[id number]] Collected: 05/31/23 1641     Updated: 05/31/23 1647    Narrative:      HISTORY: Injury after fall with left hip pain     Left hip: Frontal view the pelvis as well as frontal and crosstable left  views left hip are obtained.     There are fractures of the left superior ramus adjacent the pubic bone  and of the left inferior ramus with mild displacement. There is moderate  bilateral hip joint space narrowing. There is mild to moderate bony  spurring of the left femoral head at the junction with the femoral neck.  No proximal left femur fracture. SI joints remain intact.       Impression:      1. Mildly displaced acute left superior and inferior rami fractures.  2. Moderate left and mild arthritic changes of the hips. No proximal  femur fracture identified.  This report was finalized on 05/31/2023 16:44 by Dr. Lana Hayes MD.    XR Shoulder 2+ View Left [[id number]] Collected: 05/31/23 1638     Updated: 05/31/23 1642    Narrative:      HISTORY: Injury with fall     Left shoulder: 3 views of left shoulder are obtained.     Osteopenia. No fracture or dislocation. Mild glenohumeral joint space  narrowing. Visible left-sided ribs intact. No left apical pneumothorax.       Impression:      1. No acute  osseous injury left shoulder.  This report was finalized on 05/31/2023 16:39 by Dr. Lana Hayes MD.    CT Cervical Spine Without Contrast [[id number]] Collected: 05/31/23 1603     Updated: 05/31/23 1611    Narrative:      CT CERVICAL SPINE WO CONTRAST- 5/31/2023 3:44 PM CDT     HISTORY: Injury/fall; fall at home after losing balance.     COMPARISON: None     DOSE LENGTH PRODUCT: 265 mGy cm. Automated exposure control was also  utilized to decrease patient radiation dose.     TECHNIQUE: Axial images of cervical spine obtained with sagittal coronal  reconstructions     FINDINGS:  Minimal retrolisthesis of C5 by less than 1 mm. Mild to  moderate diffuse degenerative disc change with moderate facet  arthropathy appearing greatest on the left at the C3/C4 level. Uncinate  process hypertrophy from C5 through C7. 5 mm sclerotic focus of the  right C1 lateral mass favoring incidental bony island.     No cervical vertebral fracture or traumatic malalignment identified.     Degenerative changes creating no significant central canal stenosis.  There is scattered bilateral foraminal narrowing appearing greatest on  the left at C5/C6, moderate in severity.     Mild carotid artery calcification. No apical pneumothorax.       Impression:      1. Mild to moderate degenerative disc change with moderate facet  arthropathy and uncinate process hypertrophy. No acute cervical  vertebral fracture or traumatic malalignment. No prominent central  cervical canal stenosis with scattered neural foramen narrowing,  greatest of the left at C5/C6.  This report was finalized on 05/31/2023 16:08 by Dr. Lana Hayes MD.    CT Head Without Contrast [[id number]] Collected: 05/31/23 1600     Updated: 05/31/23 1605    Narrative:      EXAMINATION: CT HEAD WO CONTRAST- 5/31/2023 4:00 PM CDT     HISTORY: Fall, head injury. Headache.     DOSE: 523 mGycm (Automatic exposure control technique was implemented in  an effort to keep the radiation dose as  low as possible without  compromising image quality)     REPORT: Axial CT of the head was performed without contrast,  reconstructed coronal and sagittal images are also reviewed.     Comparison: There are no correlative imaging studies for comparison.     No intracranial hemorrhage, mass or mass effect is identified. The  ventricles and basal cisterns are within normal limits. There is  slightly decreased attenuation in the periventricular white matter  tracts compatible with chronic small vessel white matter ischemic  disease. The extracranial structures appear within normal limits. No  fracture is identified. There is normal aeration of the visualized  paranasal sinuses and mastoid air cells.       Impression:      No acute intracranial abnormality.   This report was finalized on 05/31/2023 16:02 by Dr. Smith Suero MD.          LAB RESULTS:      Lab 06/01/23  0422 05/31/23  1904   WBC 10.68 14.38*   HEMOGLOBIN 12.4 13.1   HEMATOCRIT 38.5 40.9   PLATELETS 145 148   NEUTROS ABS  --  12.17*   IMMATURE GRANS (ABS)  --  0.09*   LYMPHS ABS  --  1.32   MONOS ABS  --  0.74   EOS ABS  --  0.04   MCV 89.7 90.1         Lab 06/02/23  0653 06/01/23  0422 05/31/23  1904   SODIUM 137 138 140   POTASSIUM 3.9 4.1 4.2   CHLORIDE 105 102 103   CO2 23.0 25.0 26.0   ANION GAP 9.0 11.0 11.0   BUN 10 18 21   CREATININE 0.57 0.58 0.61   EGFR 93.2 92.8 91.6   GLUCOSE 113* 120* 128*   CALCIUM 8.4* 9.2 9.6         Lab 05/31/23  1904   TOTAL PROTEIN 7.2   ALBUMIN 4.2   GLOBULIN 3.0   ALT (SGPT) 27   AST (SGOT) 32   BILIRUBIN 0.5   ALK PHOS 87                     Brief Urine Lab Results  (Last result in the past 365 days)        Color   Clarity   Blood   Leuk Est   Nitrite   Protein   CREAT   Urine HCG        05/31/23 2219 Yellow   Clear   Negative   Negative   Negative   Negative                 Microbiology Results (last 10 days)       ** No results found for the last 240 hours. **          Hospital Course: Ms Cruz presented to  [facility] emergency room 5/31/2023 after a fall while trying to reach salt from an upper cabinet.  She was reaching for the salt, fell backwards and landed on her left shoulder and left hip.  She did hit her head but did not loose consciousness.  She was unable to get up and ambulate without acute pain.  X-ray pelvis revealed mildly displaced acute left superior and inferior rami fractures.  X-ray left shoulder no acute injury.  CT cervical spine mild to moderate degenerative disc change with moderate facet arthropathy and uncinate osseous hypertrophy.  No acute cervical vertebral facture or traumatic malalignment. CT head no acute intracranial abnormality.  Morphine and Dilaudid given in ER.     She was admitted to the orthopedic floor with acute displaced left superior and inferior rami fractures.  Patient fell while attempting to get salt  of upper cabinet.  She was unable to ambulate without pain.  Orthopedics consulted and Dr. Davis recommended conservative management with weightbearing as tolerated.  Physical therapy and Occupational Therapy consulted for mobilization.  By date of discharge, patient was able to ambulate in the room with a rolling walker requiring cues to keep body inside rolling walker.  Blood pressure was stable with activity without any hypotension.  Grooming task completed by patient.  Therapy recommended home health with physical therapy at discharge and patient agreeable.  She will follow-up with Dr. Davis on 6/14/23.    Lidoderm patch added for acute left posterior low back pain secondary to fall.  Patient report improvement in pain with Lidoderm patch.  Percocet 1 tablet every 8 hours ordered for moderate pain at discharge.    She has a history of primary hypertension.  Patient had episode of low blood pressure on 6/1/2023 when she worked with physical therapy at the first time.,  After initiating ambulation she became nauseated and diaphoretic and was assisted  "back to bed.  Feet were elevated and blood pressure improved 103/46, 130/64.  Orthostatic blood pressure readings obtained without significant dropping.  Blood pressure 111/31 lying, 120/47 sitting, 127/53 standing.  Patient received IV fluids.  Physical therapy worked with patient later in the afternoon without any external dizziness, lightheadedness or low blood pressure readings.  Date of discharge, blood pressure 131/55 and patient denied any dizziness, lightheadedness or nausea.    SCDs ordered for deep vein thrombosis prophylaxis.    Social service consulted for discharge planning and arranged home health with physical therapy at discharge.    On 6/2/2023, she is stable for discharge.  She has worked with therapy today and was able to mobilize in the room using a walker.  Home health with physical therapy ordered at discharge.  Patient will follow-up with Dr. Wren on 6/15/2023 and follow-up with Dr. Davis on 6/14/2023.    Physical Exam on Discharge:  /55 (BP Location: Right arm, Patient Position: Lying)   Pulse 82   Temp 99.2 °F (37.3 °C) (Oral)   Resp 16   Ht 154.9 cm (61\")   Wt 53.6 kg (118 lb 3.2 oz)   SpO2 93%   BMI 22.33 kg/m²   Physical Exam  Vitals and nursing note reviewed.   Constitutional:       Comments: Sitting up in bed.  No oxygen in use.  No visitors in room.   HENT:      Head: Normocephalic and atraumatic.      Nose: No congestion.      Mouth/Throat:      Pharynx: Oropharynx is clear. No oropharyngeal exudate or posterior oropharyngeal erythema.   Eyes:      Pupils: Pupils are equal, round, and reactive to light.   Cardiovascular:      Rate and Rhythm: Normal rate and regular rhythm.   Pulmonary:      Breath sounds: No wheezing, rhonchi or rales.      Comments: No oxygen in use.  Abdominal:      Palpations: Abdomen is soft.      Tenderness: There is no abdominal tenderness.   Genitourinary:     Comments: Voiding.  Musculoskeletal:         General: Tenderness (Left posterior " sacral region) present.      Cervical back: Normal range of motion and neck supple.   Skin:     General: Skin is warm and dry.   Neurological:      General: No focal deficit present.      Mental Status: She is alert and oriented to person, place, and time.   Psychiatric:         Mood and Affect: Mood normal.         Behavior: Behavior normal.         Thought Content: Thought content normal.         Judgment: Judgment normal.       Condition on Discharge: Stable    Discharge Disposition:  Home or Self Care Home health with physical therapy at discharge    Discharge Medications:     Discharge Medications        New Medications        Instructions Start Date   lidocaine 5 %  Commonly known as: LIDODERM   1 patch, Transdermal, Every 24 Hours Scheduled, Remove & Discard patch within 12 hours or as directed by MD   Start Date: Briana 3, 2023     oxyCODONE-acetaminophen 7.5-325 MG per tablet  Commonly known as: PERCOCET   1 tablet, Oral, Every 8 Hours PRN             Continue These Medications        Instructions Start Date   CALCIUM + D PO   600 mg, Oral, Daily      latanoprost 0.005 % ophthalmic solution  Commonly known as: XALATAN   1 drop, Left Eye, Nightly      methylcellulose (Laxative) 500 MG tablet tablet  Commonly known as: CITRUCEL   2 tablets, Oral, Every 4 Hours PRN      metoprolol succinate XL 25 MG 24 hr tablet  Commonly known as: TOPROL-XL   25 mg, Oral, 2 Times Daily      Mirabegron ER 50 MG tablet sustained-release 24 hour 24 hr tablet  Commonly known as: Myrbetriq   50 mg, Oral, Daily      multivitamin with minerals tablet tablet   1 tablet, Oral, Daily      timolol 0.5 % ophthalmic solution  Commonly known as: TIMOPTIC   1 drop, Both Eyes, 2 Times Daily             Discharge Diet:   Diet Instructions       Diet: Regular/House Diet; Regular Texture (IDDSI 7); Thin (IDDSI 0)      Discharge Diet: Regular/House Diet    Texture: Regular Texture (IDDSI 7)    Fluid Consistency: Thin (IDDSI 0)          Activity at  Discharge:   Activity Instructions       Other Activity Instructions      Activity Instructions: Weightbearing as tolerated per Dr. Davis.  Walker for safety.          Discharge instructions:  1.  For recurrent fall seek medical attention.  2.  Home health with physical therapy  3.  Weightbearing as tolerated per Dr. Ryan araya for safety  4.  Lidoderm patch to sacral region for pain.  5.  Follow-up with Dr. Davis in 2 weeks 6/14/2023.  6.  Follow-up with Dr. Wren 6/15/2023    Follow-up Appointments:   Future Appointments   Date Time Provider Department Center   6/13/2023  1:15 PM Maye Bronson APRN [facility]   11/3/2023  9:00 AM Tj Nesbitt MD [facility]     Dr. Wren 6/15/2023  Dr. Davis 6/14/2023    Test Results Pending at Discharge: None    Electronically signed by JOANNE Leroy, 06/02/23, 08:53 CDT.    Time: 35 minutes.  Discussed with Dr. Quiroga and patient.    The above documentation resulted from a face-to-face encounter by me Elif RUBIO, [facility].    This visit was performed by both a physician and an APC. I personally evaluated and examined the patient. I performed all aspects of the MDM as documented.    Discussed with her nurse, Yvonne.     Up in the chair.  Just finished lunch.  No distress.    Did well with occupational therapy earlier this morning after taking a pain pill.    To work with physical therapy this afternoon in the hallway.  She has 2 steps to get into her house and she wants to work on that before her  picks her up to take her home.    They wish to go home with home health and family assistance rather than skilled nursing.    To follow with Dr. Davis on 6/14.    Electronically signed by Wilmar Quiroga DO, 6/2/2023, 12:25 CDT.

## 2023-06-02 NOTE — CASE MANAGEMENT/SOCIAL WORK
Continued Stay Note   [place]     Patient Name: Michel Cruz  MRN: [id number]  Today's Date: 6/2/2023    Admit Date: 5/31/2023        Discharge Plan       Row Name 06/02/23 1053       Plan    Final Note Yanely  is unable to take due to insurance.  sent referral to [facility] and they can accept referral. Orders placed in basket.      Row Name 06/02/23 0910       Plan    Final Discharge Disposition Code 06 - home with home health care    Final Note Pt is being dcd home today. Orders for HH received and pt prefers [facility]. [facility]                   Discharge Codes    No documentation.                 Expected Discharge Date and Time       Expected Discharge Date Expected Discharge Time    Jun 2, 2023               DAV Santiago

## 2023-06-04 NOTE — THERAPY DISCHARGE NOTE
Acute Care - Occupational Therapy Discharge Summary  [facility]     Patient Name: Michel Cruz  : 1945  MRN: [id number]    Today's Date: 2023                 Admit Date: 2023        OT Recommendation and Plan    Visit Dx:    ICD-10-CM ICD-9-CM   1. Closed fracture of multiple pubic rami, left, initial encounter  S32.592A 808.2   2. Fall, initial encounter  W19.XXXA E888.9   3. Impaired mobility [Z74.09 (ICD-10-CM)]  Z74.09 799.89                OT Rehab Goals       Row Name 23 0700             Transfer Goal 1 (OT)    Activity/Assistive Device (Transfer Goal 1, OT) tub;toilet  -TS      [place] Level/Cues Needed (Transfer Goal 1, OT) supervision required  -TS      Time Frame (Transfer Goal 1, OT) long term goal (LTG)  -TS      Progress/Outcome (Transfer Goal 1, OT) goal not met  -TS         Dressing Goal 1 (OT)    Activity/Device (Dressing Goal 1, OT) lower body dressing  -TS      [place]/Cues Needed (Dressing Goal 1, OT) supervision required  -TS      Time Frame (Dressing Goal 1, OT) long term goal (LTG)  -TS      Progress/Outcome (Dressing Goal 1, OT) goal not met  -TS         Toileting Goal 1 (OT)    Activity/Device (Toileting Goal 1, OT) toileting skills, all  -TS      [place] Level/Cues Needed (Toileting Goal 1, OT) supervision required  -TS      Time Frame (Toileting Goal 1, OT) long term goal (LTG)  -TS      Progress/Outcome (Toileting Goal 1, OT) goal not met  -TS                User Key  (r) = Recorded By, (t) = Taken By, (c) = Cosigned By      Initials Name Provider Type Discipline    TS Claudia Gomez COTA Occupational Therapist Assistant OT                        Timed Therapy Charges  Total Units: 5      Suggested Charges  Total Units: 5      Procedure Name Documented Minutes Units Code    HC OT SELF CARE/MGMT/TRAIN EA 15 MIN 71 5   29986 (CPT®)                 Documented Minutes  Total Minutes: 71      Therapy Provided Minutes    97683 - OT Self Care/Mgmt  Minutes 71                        OT Discharge Summary  Anticipated Discharge Disposition (OT): home with assist  Reason for Discharge: Discharge from facility  Outcomes Achieved: Refer to plan of care for updates on goals achieved  Discharge Destination: Home with assist      MICHAEL Nguyen  6/4/2023

## 2023-06-05 ENCOUNTER — HOME CARE VISIT (OUTPATIENT)
Dept: HOME HEALTH SERVICES | Facility: CLINIC | Age: 78
End: 2023-06-05
Payer: MEDICARE

## 2023-06-05 VITALS
TEMPERATURE: 98.9 F | DIASTOLIC BLOOD PRESSURE: 62 MMHG | WEIGHT: 110 LBS | HEART RATE: 73 BPM | HEIGHT: 61 IN | RESPIRATION RATE: 18 BRPM | SYSTOLIC BLOOD PRESSURE: 132 MMHG | BODY MASS INDEX: 20.77 KG/M2 | OXYGEN SATURATION: 99 %

## 2023-06-05 PROCEDURE — G0151 HHCP-SERV OF PT,EA 15 MIN: HCPCS

## 2023-06-05 NOTE — Clinical Note
SOC Note: L pelvic rami fractures fro a fall, [place] patient    Home Health ordered for: PT , OT    Reason for Hosp/Primary Dx/Co-morbidities: fractured pelvis    Focus of Care: pain mgmt, mobility    Patient's goal(s): decrease pain symptoms, improve mobility and safety    Current Functional status/mobility/DME: see DME    HB status/Living Arrangements: Lives with spouse. 3 steps in/out of home    Skin Integrity/wound status: intact    Code Status: FULL CODE    Fall Risk/Safety concerns: mod falls risk d/t pain and abnormal gait    Medication issues/Concerns:none    Additional Problems/Concerns: none    SDOH Barriers (i.e. caregiver concerns, social isolation, transportation, food insecurity, environment, income etc.)/Need for MSW: NONE    Plan for next visit: mobility, safety, pain mgmt, HEP

## 2023-06-06 NOTE — HOME HEALTH
SOC Note: L pelvic rami fractures fro a fall, [place] patient    Home Health ordered for: PT , OT    Reason for Hosp/Primary Dx/Co-morbidities: fractured pelvis    Focus of Care: pain mgmt, mobility    Patient's goal(s): decrease pain symptoms, improve mobility and safety    Current Functional status/mobility/DME: see DME    HB status/Living Arrangements: Lives with spouse. 3 steps in/out of home    Skin Integrity/wound status: intact    Code Status: FULL CODE    Fall Risk/Safety concerns: mod falls risk d/t pain and abnormal gait    Medication issues/Concerns:none    Additional Problems/Concerns: none    SDOH Barriers (i.e. caregiver concerns, social isolation, transportation, food insecurity, environment, income etc.)/Need for MSW: NONE    Plan for next visit: mobility, safety, pain mgmt, HEP

## 2023-06-07 ENCOUNTER — HOME CARE VISIT (OUTPATIENT)
Dept: HOME HEALTH SERVICES | Facility: CLINIC | Age: 78
End: 2023-06-07
Payer: MEDICARE

## 2023-06-07 VITALS
SYSTOLIC BLOOD PRESSURE: 122 MMHG | OXYGEN SATURATION: 98 % | RESPIRATION RATE: 12 BRPM | HEART RATE: 67 BPM | DIASTOLIC BLOOD PRESSURE: 68 MMHG | TEMPERATURE: 98.3 F

## 2023-06-07 PROCEDURE — G0157 HHC PT ASSISTANT EA 15: HCPCS

## 2023-06-07 NOTE — HOME HEALTH
COVID SCREENING: no s/s  FOCUS OF CARE/SKILLED NEED: jerson ramirez  TEACHING/INTERVENTIONS: HEP, pain management  PROGRESS TOWARD GOALS: pt is progressing toward goals  PHYSICIAN CONTACT: n/a  INSURANCE CHANGES: no  FALLS SINCE LAST HH VISIT? no  MEDICATION CHANGES SINCE LAST HH VISIT? no  PLAN FOR NEXT VISIT: jerson ramirez ex    Pt says that she has been in pain and had to take a pain pill yesterday.

## 2023-06-12 ENCOUNTER — HOME CARE VISIT (OUTPATIENT)
Dept: HOME HEALTH SERVICES | Facility: CLINIC | Age: 78
End: 2023-06-12
Payer: MEDICARE

## 2023-06-12 VITALS
HEART RATE: 74 BPM | RESPIRATION RATE: 16 BRPM | DIASTOLIC BLOOD PRESSURE: 68 MMHG | SYSTOLIC BLOOD PRESSURE: 114 MMHG | OXYGEN SATURATION: 97 % | TEMPERATURE: 98.1 F

## 2023-06-12 PROCEDURE — G0157 HHC PT ASSISTANT EA 15: HCPCS

## 2023-06-12 NOTE — HOME HEALTH
COVID SCREENING: no s/s  FOCUS OF CARE/SKILLED NEED: gait, ther ex  TEACHING/INTERVENTIONS: HEP  PROGRESS TOWARD GOALS: pt is progressing toward goals  PHYSICIAN CONTACT: n/a  INSURANCE CHANGES: no  FALLS SINCE LAST HH VISIT? no  MEDICATION CHANGES SINCE LAST HH VISIT? Oxycodone 7.5/325mg added to med list.  PLAN FOR NEXT VISIT: james ther ex    Pt says that she had severe pelvic pain on 6/9/2023 that she thought about going to the hospital.

## 2023-06-14 ENCOUNTER — HOME CARE VISIT (OUTPATIENT)
Dept: HOME HEALTH SERVICES | Facility: CLINIC | Age: 78
End: 2023-06-14
Payer: MEDICARE

## 2023-06-14 VITALS
OXYGEN SATURATION: 98 % | DIASTOLIC BLOOD PRESSURE: 60 MMHG | TEMPERATURE: 98.6 F | SYSTOLIC BLOOD PRESSURE: 104 MMHG | HEART RATE: 74 BPM | RESPIRATION RATE: 16 BRPM

## 2023-06-14 PROCEDURE — G0152 HHCP-SERV OF OT,EA 15 MIN: HCPCS

## 2023-06-15 ENCOUNTER — HOME CARE VISIT (OUTPATIENT)
Dept: HOME HEALTH SERVICES | Facility: CLINIC | Age: 78
End: 2023-06-15
Payer: MEDICARE

## 2023-06-15 VITALS
HEART RATE: 70 BPM | DIASTOLIC BLOOD PRESSURE: 60 MMHG | TEMPERATURE: 97.6 F | RESPIRATION RATE: 16 BRPM | SYSTOLIC BLOOD PRESSURE: 90 MMHG | OXYGEN SATURATION: 98 %

## 2023-06-15 PROCEDURE — G0151 HHCP-SERV OF PT,EA 15 MIN: HCPCS

## 2023-06-15 NOTE — HOME HEALTH
OCCUPATIONAL THERAPY EVALUATION    REASON FOR REFERRAL-  Patient referred due to fall with pelvic fracture  PRIMARY DIAGNOSIS-  Multiple fractures of pelvis   SECONDARY DIAGNOSIS-  pain, OA, HTN  SURGICAL PROCEDURES-  none    PREVIOUS OCCUPATIONAL THERAPY- no  OXYGEN USE- no    CLINICAL FINDINGS:  WOUND / SKIN CONDITION- none  EDEMA-  L foot and knee  PAIN-  8/10                       LOCATION-   L pelvic area                               CONTROLLED WITH-          EQUIPMENT walker, BSC by bed, wheelchair, extended height toilet                         DRIVING- not currently          FUNCTIONAL ENDURANCE FOR SAFE ACTIVITIES OF DAILY LIVING / INSTRUMENTAL ACTIVITIES OF DAILY LIVING:  fair         WEIGHT BEARING STATUS/PRECAUTIONS-   WBAT  ASSISTIVE DEVICE-   walker    ACTIVITIES OF DAILY LIVING MOBILITY/FALLS RISK ASSESSMENT- at risk for falls due to weakness, impaired gait and balance, dependence on AD      MEDICAL NECESSITY- Skilled OT eval medically necessary to assess safety and ADL's in the home.    PATIENT GOAL FOR THIS EPISODE OF CARE-  Eval only    TODAY'S INTERVENTIONS-   Initial eval completed. Goals and POC discussed with patient and family and ADL training initiated. Patient reports she is doing well and her daughter is helping with showers at this time. Patient states she is ok with getting help from family until she is able to do so herself.    REHAB POTENTIAL-   good for stated goals    DATE OF NEXT APPOINTMENT WITH DOCTOR- 3 weeks for ortho  PATIENT / CAREGIVER AGREE WITH DISCHARGE PLAN- yes  COMMUNICATION / CARE COORDINATION- staff re: OT eval

## 2023-06-15 NOTE — HOME HEALTH
pre-screened covid 19    Patient reports she had follow up with Dr. Davis's office yesterday with updated X-rays.  Patient reports she continues to have intermittent L pelvic pain.  Patient reports she is walking with rolling walker.   No new complaints.

## 2023-06-19 ENCOUNTER — HOME CARE VISIT (OUTPATIENT)
Dept: HOME HEALTH SERVICES | Facility: CLINIC | Age: 78
End: 2023-06-19
Payer: MEDICARE

## 2023-06-19 VITALS
RESPIRATION RATE: 16 BRPM | SYSTOLIC BLOOD PRESSURE: 112 MMHG | DIASTOLIC BLOOD PRESSURE: 70 MMHG | HEART RATE: 74 BPM | TEMPERATURE: 97.9 F | OXYGEN SATURATION: 99 %

## 2023-06-19 PROCEDURE — G0157 HHC PT ASSISTANT EA 15: HCPCS

## 2023-06-19 NOTE — HOME HEALTH
COVID SCREENING: no s/s  FOCUS OF CARE/SKILLED NEED: jerson ramirez ex  TEACHING/INTERVENTIONS: HEP  PROGRESS TOWARD GOALS: pt is progressing toward goals  PHYSICIAN CONTACT: n/a  INSURANCE CHANGES: no  MEDICATION CHANGES SINCE LAST HH VISIT? Meloxicam 7.5mg added to med list.  PLAN FOR NEXT VISIT: james ther ex    Pt says that she has not slept well the past few nights due to pelvis pain. Pt c/o not being able to find a comfortable position to lay in.

## 2023-06-19 NOTE — Clinical Note
Pt is making good progress, but still has several unmet goals and would benefit from continuing visits for a few more weeks to work on walking outside and attempt walking with a cane.

## 2023-08-03 NOTE — PROGRESS NOTES
Subjective    Ms. Cruz is 78 y.o. female    Chief Complaint: OAB    History of Present Illness    78-year-old female established patient in for follow-up regarding overactive bladder and urge incontinence.  was increased to 50mg myrbetriq daily. Reports is not leaking at all. And nocturia is improved. At times does not wake at all to urinate. With the worse of night awaking twice instead of 4 times prior to starting any myrbetriq.      Previously tried oxybutynin however experienced increased blurry vision and was quickly stopped as patient has a history of glaucoma.    The following portions of the patient's history were reviewed and updated as appropriate: allergies, current medications, past family history, past medical history, past social history, past surgical history and problem list.    Review of Systems   Constitutional:  Negative for chills, fatigue and fever.   Gastrointestinal:  Negative for abdominal pain, anal bleeding, blood in stool, nausea and vomiting.   Genitourinary:  Positive for urgency. Negative for difficulty urinating, dysuria, flank pain, frequency, hematuria, pelvic pain and vaginal pain.       Current Outpatient Medications:     Calcium Citrate-Vitamin D (CALCIUM + D PO), Take 600 mg by mouth Daily. Indications: supplement, Disp: , Rfl:     methylcellulose, Laxative, (CITRUCEL) 500 MG tablet tablet, Take 2 tablets by mouth Every 4 (Four) Hours As Needed (constipation). Indications: supplement, Disp: , Rfl:     metoprolol succinate XL (TOPROL-XL) 25 MG 24 hr tablet, Take 0.5 tablets by mouth 2 (Two) Times a Day. Indications: High Blood Pressure Disorder, Disp: , Rfl:     Mirabegron ER (Myrbetriq) 50 MG tablet sustained-release 24 hour 24 hr tablet, Take 50 mg by mouth Daily. Indications: Overactive Bladder, Disp: 30 tablet, Rfl: 11    montelukast (SINGULAIR) 10 MG tablet, Take 1 tablet by mouth Every Night., Disp: , Rfl:     Multiple Vitamins-Minerals (MULTIVITAMIN ADULT PO), Take 1  "tablet by mouth Daily. Indications: supplement, Disp: , Rfl:     pravastatin (PRAVACHOL) 40 MG tablet, , Disp: , Rfl:     timolol (TIMOPTIC) 0.5 % ophthalmic solution, Administer 1 drop to both eyes 2 (Two) Times a Day. Indications: Wide-Angle Glaucoma, Disp: , Rfl:     latanoprost (XALATAN) 0.005 % ophthalmic solution, Administer 1 drop into the left eye Every Night. Indications: Wide-Angle Glaucoma (Patient not taking: Reported on 8/15/2023), Disp: , Rfl:     Past Medical History:   Diagnosis Date    Essential hypertension 2023    History of tachycardia     Hyperlipidemia     Mitral valve prolapse     Osteoporosis     Urinary incontinence     Have trouble holding when need to empty bladder       Past Surgical History:   Procedure Laterality Date    BREAST BIOPSY       SECTION      CHOLECYSTECTOMY      EYE SURGERY      GANGLION CYST EXCISION      x2    HYSTERECTOMY      TONSILLECTOMY         Social History     Socioeconomic History    Marital status:    Tobacco Use    Smoking status: Never     Passive exposure: Never    Smokeless tobacco: Never   Vaping Use    Vaping Use: Never used   Substance and Sexual Activity    Alcohol use: No    Drug use: No    Sexual activity: Not Currently     Partners: Male     Birth control/protection: Hysterectomy       Family History   Problem Relation Age of Onset    Melanoma Father     Hypertension Father     Stroke Father     Coronary artery disease Father     Heart disease Father     Breast cancer Mother     Hypertension Mother     Diabetes Mother     Coronary artery disease Mother     Cancer Mother         Breast cancer. Both breasts    Heart disease Mother     Stroke Maternal Grandmother        Objective    Temp 97.8 øF (36.6 øC)   Ht 154.9 cm (60.98\")   Wt 49.9 kg (110 lb)   BMI 20.80 kg/mý     Physical Exam  Nursing note reviewed.   Constitutional:       General: She is not in acute distress.     Appearance: Normal appearance. She is not " ill-appearing.   HENT:      Nose: No congestion.   Abdominal:      Tenderness: There is no right CVA tenderness or left CVA tenderness.      Hernia: No hernia is present.   Skin:     General: Skin is warm and dry.   Neurological:      Mental Status: She is alert and oriented to person, place, and time.   Psychiatric:         Mood and Affect: Mood normal.         Behavior: Behavior normal.           Results for orders placed or performed in visit on 08/15/23   POC Urinalysis Dipstick, Multipro    Specimen: Urine   Result Value Ref Range    Color Yellow Yellow, Straw, Dark Yellow, Chayo    Clarity, UA Clear Clear    Glucose, UA Negative Negative mg/dL    Bilirubin Negative Negative    Ketones, UA Negative Negative    Specific Gravity  1.025 1.005 - 1.030    Blood, UA Trace (A) Negative    pH, Urine 5.0 5.0 - 8.0    Protein, POC Negative Negative mg/dL    Urobilinogen, UA 0.2 E.U./dL Normal, 0.2 E.U./dL    Nitrite, UA Negative Negative    Leukocytes Negative Negative   Estimation of residual urine via abdominal ultrasound  Residual Urine: 0 ml  Indication: OAB  Position: Supine  Examination: Incremental scanning of the suprapubic area using 3 MHz transducer using copious amounts of acoustic gel.   Findings: An anechoic area was demonstrated which represented the bladder, with measurement of residual urine as noted. I inspected this myself. In that the residual urine was stable or insignificant, no treatment will be necessary at this time.      Assessment and Plan    Diagnoses and all orders for this visit:    1. OAB (overactive bladder) (Primary)  -     POC Urinalysis Dipstick, Multipro  -     Mirabegron ER (Myrbetriq) 50 MG tablet sustained-release 24 hour 24 hr tablet; Take 50 mg by mouth Daily. Indications: Overactive Bladder  Dispense: 30 tablet; Refill: 11    2. Urge incontinence  -     POC Urinalysis Dipstick, Multipro  -     Mirabegron ER (Myrbetriq) 50 MG tablet sustained-release 24 hour 24 hr tablet; Take 50  mg by mouth Daily. Indications: Overactive Bladder  Dispense: 30 tablet; Refill: 11      78-year-old female established patient in for follow-up regarding overactive bladder and urge incontinence.  was increased to 50mg myrbetriq daily. Reports is not leaking at all. And nocturia is improved. At times does not wake at all to urinate. With the worse of night awaking twice instead of 4 times prior to starting any myrbetriq.     Will continue myrbetriq 50mg daily    F/u in 1 year

## 2023-08-15 ENCOUNTER — OFFICE VISIT (OUTPATIENT)
Dept: UROLOGY | Facility: CLINIC | Age: 78
End: 2023-08-15
Payer: MEDICARE

## 2023-08-15 VITALS — WEIGHT: 110 LBS | HEIGHT: 61 IN | TEMPERATURE: 97.8 F | BODY MASS INDEX: 20.77 KG/M2

## 2023-08-15 DIAGNOSIS — N39.41 URGE INCONTINENCE: ICD-10-CM

## 2023-08-15 DIAGNOSIS — N32.81 OAB (OVERACTIVE BLADDER): Primary | ICD-10-CM

## 2023-08-15 LAB
BILIRUB BLD-MCNC: NEGATIVE MG/DL
CLARITY, POC: CLEAR
COLOR UR: YELLOW
GLUCOSE UR STRIP-MCNC: NEGATIVE MG/DL
KETONES UR QL: NEGATIVE
LEUKOCYTE EST, POC: NEGATIVE
NITRITE UR-MCNC: NEGATIVE MG/ML
PH UR: 5 [PH] (ref 5–8)
PROT UR STRIP-MCNC: NEGATIVE MG/DL
RBC # UR STRIP: ABNORMAL /UL
SP GR UR: 1.02 (ref 1–1.03)
UROBILINOGEN UR QL: ABNORMAL

## 2023-08-15 RX ORDER — PRAVASTATIN SODIUM 40 MG
TABLET ORAL
COMMUNITY
Start: 2023-08-08

## 2023-08-15 RX ORDER — MONTELUKAST SODIUM 10 MG/1
10 TABLET ORAL NIGHTLY
COMMUNITY

## 2023-11-03 ENCOUNTER — OFFICE VISIT (OUTPATIENT)
Dept: SURGERY | Facility: CLINIC | Age: 78
End: 2023-11-03
Payer: MEDICARE

## 2023-11-03 VITALS
OXYGEN SATURATION: 96 % | HEIGHT: 61 IN | DIASTOLIC BLOOD PRESSURE: 57 MMHG | HEART RATE: 81 BPM | WEIGHT: 110 LBS | BODY MASS INDEX: 20.77 KG/M2 | SYSTOLIC BLOOD PRESSURE: 131 MMHG

## 2023-11-03 DIAGNOSIS — Z12.31 SCREENING MAMMOGRAM FOR HIGH-RISK PATIENT: Primary | ICD-10-CM

## 2023-11-03 PROCEDURE — 99213 OFFICE O/P EST LOW 20 MIN: CPT | Performed by: SPECIALIST

## 2023-11-03 PROCEDURE — 3075F SYST BP GE 130 - 139MM HG: CPT | Performed by: SPECIALIST

## 2023-11-03 PROCEDURE — 3078F DIAST BP <80 MM HG: CPT | Performed by: SPECIALIST

## 2023-11-03 PROCEDURE — 1159F MED LIST DOCD IN RCRD: CPT | Performed by: SPECIALIST

## 2023-11-03 PROCEDURE — 1160F RVW MEDS BY RX/DR IN RCRD: CPT | Performed by: SPECIALIST

## 2023-11-03 NOTE — PROGRESS NOTES
Office Established Patient Note:     Referring Provider: Dr. Wren    Chief complaint follow-up yearly breast exam    Subjective .     History of present illness:  Michel Cruz is a 78 y.o. female  status post previous excision of a breast abnormality in the inferior lateral right breast.  She has done well from her surgery, she is here for yearly examination.  No problems no change in her exam over the past year, she has had a fall with a pelvic fracture that was treated this past year.  She has had no problems with her chest or her breast.     She is retired , , positive family breast cancer in her mother at age 70, she has had a previous right lateral inferior excisional biopsy, no history of cancer, does have osteoporosis.         History  Past Medical History:   Diagnosis Date    Essential hypertension 2023    History of tachycardia     Hyperlipidemia     Mitral valve prolapse     Osteoporosis     Urinary incontinence     Have trouble holding when need to empty bladder   ,   Past Surgical History:   Procedure Laterality Date    BREAST BIOPSY       SECTION      CHOLECYSTECTOMY      EYE SURGERY      GANGLION CYST EXCISION      x2    HYSTERECTOMY      TONSILLECTOMY     ,   Family History   Problem Relation Age of Onset    Melanoma Father     Hypertension Father     Stroke Father     Coronary artery disease Father     Heart disease Father     Breast cancer Mother     Hypertension Mother     Diabetes Mother     Coronary artery disease Mother     Cancer Mother         Breast cancer. Both breasts    Heart disease Mother     Stroke Maternal Grandmother    ,   Social History     Tobacco Use    Smoking status: Never     Passive exposure: Never    Smokeless tobacco: Never   Vaping Use    Vaping Use: Never used   Substance Use Topics    Alcohol use: No    Drug use: No   , (Not in a hospital admission)   and Allergies:  Clindamycin and Penicillins    Current Outpatient  "Medications:     Calcium Citrate-Vitamin D (CALCIUM + D PO), Take 600 mg by mouth Daily. Indications: supplement, Disp: , Rfl:     latanoprost (XALATAN) 0.005 % ophthalmic solution, Administer 1 drop into the left eye Every Night. Indications: Wide-Angle Glaucoma (Patient not taking: Reported on 8/15/2023), Disp: , Rfl:     methylcellulose, Laxative, (CITRUCEL) 500 MG tablet tablet, Take 2 tablets by mouth Every 4 (Four) Hours As Needed (constipation). Indications: supplement, Disp: , Rfl:     metoprolol succinate XL (TOPROL-XL) 25 MG 24 hr tablet, Take 0.5 tablets by mouth 2 (Two) Times a Day. Indications: High Blood Pressure Disorder, Disp: , Rfl:     Mirabegron ER (Myrbetriq) 50 MG tablet sustained-release 24 hour 24 hr tablet, Take 50 mg by mouth Daily. Indications: Overactive Bladder, Disp: 30 tablet, Rfl: 11    montelukast (SINGULAIR) 10 MG tablet, Take 1 tablet by mouth Every Night., Disp: , Rfl:     Multiple Vitamins-Minerals (MULTIVITAMIN ADULT PO), Take 1 tablet by mouth Daily. Indications: supplement, Disp: , Rfl:     pravastatin (PRAVACHOL) 40 MG tablet, , Disp: , Rfl:     timolol (TIMOPTIC) 0.5 % ophthalmic solution, Administer 1 drop to both eyes 2 (Two) Times a Day. Indications: Wide-Angle Glaucoma, Disp: , Rfl:     Objective     Vital Signs   There were no vitals taken for this visit.     Physical Exam:  Thin white female no acute distress, large breast, pendulous, well-healed incision in the inferior lateral right breast, there is no discharge no erythema no adenopathy no retractions no palpable abnormalities in the left or right breast, no axillary or supra or infraclavicular abnormalities notedPhysical Exam  Chest:             Recent mammogram shows BI-RADS category I.    Results Review:  Result Review :  No results found for: \"FINALDX\", \"GROSSDES\"    BMI is within normal parameters. No other follow-up for BMI required.    Assessment & Plan     Unremarkable breast exam unremarkable mammography " screening mammogram without abnormalities, no palpable lesions noted, she will be discharged to follow-up with me in 1 year with mammography.    Discussed BMI elevation and need to move toward a reduced BMI for health concerns she appears to understand she is a non smoker and her meds were reviewed.      Tj Nesbitt MD  11/03/23  07:28 CDT

## 2023-11-03 NOTE — PATIENT INSTRUCTIONS
Thanks for coming today Ms. Cruz, I am glad you are doing well with your health problems as well as her 's your mammogram looks great as well as your breast exam come back and see me as you need to yearly mammogram in 1 year.  Thank you for letting me take care of your problems these past years

## 2023-11-06 DIAGNOSIS — Z12.31 ENCOUNTER FOR SCREENING MAMMOGRAM FOR MALIGNANT NEOPLASM OF BREAST: Primary | ICD-10-CM

## 2024-02-08 ENCOUNTER — OFFICE VISIT (OUTPATIENT)
Dept: GASTROENTEROLOGY | Facility: CLINIC | Age: 79
End: 2024-02-08
Payer: MEDICARE

## 2024-02-08 VITALS
HEIGHT: 62 IN | WEIGHT: 114 LBS | BODY MASS INDEX: 20.98 KG/M2 | OXYGEN SATURATION: 99 % | SYSTOLIC BLOOD PRESSURE: 142 MMHG | DIASTOLIC BLOOD PRESSURE: 78 MMHG | TEMPERATURE: 97.1 F | HEART RATE: 70 BPM

## 2024-02-08 DIAGNOSIS — Z12.11 ENCOUNTER FOR SCREENING FOR MALIGNANT NEOPLASM OF COLON: Primary | ICD-10-CM

## 2024-02-08 RX ORDER — DIAZEPAM 2 MG/1
1 TABLET ORAL 2 TIMES DAILY PRN
COMMUNITY

## 2024-02-08 NOTE — H&P (VIEW-ONLY)
[facility]    Primary Physician Isrrael Wren MD    2024    Michel Cruz   1945      Chief Complaint   Patient presents with    Colonoscopy       Subjective     HPI    Michel Cruz is a 79 y.o. female who presents as a referral for preventative maintenance. She has no complaints of nausea or vomiting. No change in bowels. No wt loss. No BRBPR. No melena. No abdominal pain.       SCANNED - COLONOSCOPY (2014 00:00) recall 10 years.   No personal colon polyps/colon cancer.     Paternal gf had colon cancer.       Past Medical History:   Diagnosis Date    Anxiety     Essential hypertension 2023    History of tachycardia     Hyperlipidemia     Mitral valve prolapse     Osteoporosis     Pityriasis rosea     Tachycardia     Urinary incontinence     Have trouble holding when need to empty bladder       Past Surgical History:   Procedure Laterality Date    BREAST BIOPSY       SECTION      CHOLECYSTECTOMY      COLONOSCOPY  2014    normal    EYE SURGERY      GANGLION CYST EXCISION      x2    HYSTERECTOMY      TONSILLECTOMY         Outpatient Medications Marked as Taking for the 24 encounter (Office Visit) with Diana Phelan APRN   Medication Sig Dispense Refill    Ascorbic Acid (VITAMIN C PO) Take  by mouth Daily.      BIOTIN PO Take  by mouth Daily.      Calcium Citrate-Vitamin D (CALCIUM + D PO) Take 600 mg by mouth Daily. Indications: supplement      diazePAM (VALIUM) 2 MG tablet Take 0.5 tablets by mouth 2 (Two) Times a Day As Needed for Anxiety.      latanoprost (XALATAN) 0.005 % ophthalmic solution Administer 1 drop into the left eye Every Night.      methylcellulose, Laxative, (CITRUCEL) 500 MG tablet tablet Take 2 tablets by mouth Every 4 (Four) Hours As Needed (constipation). Indications: supplement      metoprolol succinate XL (TOPROL-XL) 25 MG 24 hr tablet Take 0.5 tablets by mouth 2 (Two) Times a Day. Indications: High Blood Pressure  Disorder      Mirabegron ER (Myrbetriq) 50 MG tablet sustained-release 24 hour 24 hr tablet Take 50 mg by mouth Daily. Indications: Overactive Bladder 30 tablet 11    montelukast (SINGULAIR) 10 MG tablet Take 1 tablet by mouth Every Night.      Multiple Vitamins-Minerals (MULTIVITAMIN ADULT PO) Take 1 tablet by mouth Daily. Indications: supplement      pravastatin (PRAVACHOL) 40 MG tablet       timolol (TIMOPTIC) 0.5 % ophthalmic solution Administer 1 drop to both eyes 2 (Two) Times a Day. Indications: Wide-Angle Glaucoma         Allergies   Allergen Reactions    Clindamycin Rash    Penicillins Rash       Social History     Socioeconomic History    Marital status:    Tobacco Use    Smoking status: Never     Passive exposure: Never    Smokeless tobacco: Never   Vaping Use    Vaping Use: Never used   Substance and Sexual Activity    Alcohol use: No    Drug use: No    Sexual activity: Not Currently     Partners: Male     Birth control/protection: Hysterectomy       Family History   Problem Relation Age of Onset    Breast cancer Mother     Hypertension Mother     Diabetes Mother     Coronary artery disease Mother     Cancer Mother         Breast cancer. Both breasts    Heart disease Mother     Melanoma Father     Hypertension Father     Stroke Father     Coronary artery disease Father     Heart disease Father     Stroke Maternal Grandmother     Colon cancer Paternal Grandfather        Review of Systems   Constitutional:  Negative for chills, fever and unexpected weight change.   Respiratory:  Negative for shortness of breath.    Cardiovascular:  Negative for chest pain.   Gastrointestinal:  Negative for abdominal distention, abdominal pain, anal bleeding, blood in stool, constipation, diarrhea, nausea and vomiting.       Objective     Vitals:    02/08/24 1216   BP: 142/78   Pulse: 70   Temp: 97.1 °F (36.2 °C)   SpO2: 99%         02/08/24  1216   Weight: 51.7 kg (114 lb)     Body mass index is 21.19  kg/m².    Physical Exam  Vitals reviewed.   Constitutional:       General: She is not in acute distress.  Cardiovascular:      Rate and Rhythm: Normal rate and regular rhythm.      Heart sounds: Normal heart sounds.   Pulmonary:      Effort: Pulmonary effort is normal.      Breath sounds: Normal breath sounds.   Abdominal:      General: Bowel sounds are normal. There is no distension.      Palpations: Abdomen is soft.      Tenderness: There is no abdominal tenderness.   Skin:     General: Skin is warm and dry.   Neurological:      Mental Status: She is alert.         Imaging Results (Most Recent)       None            Assessment & Plan     Diagnoses and all orders for this visit:    1. Encounter for screening for malignant neoplasm of colon (Primary)  -     Case Request; Standing  -     Case Request    Other orders  -     Implement Anesthesia Orders Day of Procedure; Standing  -     Obtain Informed Consent; Standing            Schedule colonoscopy. Miralax prep.                COLONOSCOPY WITH ANESTHESIA (N/A)  All risks, benefits, alternatives, and indications of colonoscopy procedure have been discussed with the patient. Risks to include perforation of the colon requiring possible surgery or colostomy, risk of bleeding from biopsies or removal of colon tissue, possibility of missing a colon polyp or cancer, or adverse drug reaction.  Benefits to include the diagnosis and management of disease of the colon and rectum. Alternatives to include barium enema, radiographic evaluation, lab testing or no intervention. Pt verbalizes understanding and agrees.     There are no Patient Instructions on file for this visit.    JOANNE Vallejo

## 2024-02-08 NOTE — PROGRESS NOTES
[facility]ology    Primary Physician Isrrael Wren MD    2024    Michel Cruz   1945      Chief Complaint   Patient presents with    Colonoscopy       Subjective     HPI    Michel Cruz is a 79 y.o. female who presents as a referral for preventative maintenance. She has no complaints of nausea or vomiting. No change in bowels. No wt loss. No BRBPR. No melena. No abdominal pain.       SCANNED - COLONOSCOPY (2014 00:00) recall 10 years.   No personal colon polyps/colon cancer.     Paternal gf had colon cancer.       Past Medical History:   Diagnosis Date    Anxiety     Essential hypertension 2023    History of tachycardia     Hyperlipidemia     Mitral valve prolapse     Osteoporosis     Pityriasis rosea     Tachycardia     Urinary incontinence     Have trouble holding when need to empty bladder       Past Surgical History:   Procedure Laterality Date    BREAST BIOPSY       SECTION      CHOLECYSTECTOMY      COLONOSCOPY  2014    normal    EYE SURGERY      GANGLION CYST EXCISION      x2    HYSTERECTOMY      TONSILLECTOMY         Outpatient Medications Marked as Taking for the 24 encounter (Office Visit) with Diana Phelan APRN   Medication Sig Dispense Refill    Ascorbic Acid (VITAMIN C PO) Take  by mouth Daily.      BIOTIN PO Take  by mouth Daily.      Calcium Citrate-Vitamin D (CALCIUM + D PO) Take 600 mg by mouth Daily. Indications: supplement      diazePAM (VALIUM) 2 MG tablet Take 0.5 tablets by mouth 2 (Two) Times a Day As Needed for Anxiety.      latanoprost (XALATAN) 0.005 % ophthalmic solution Administer 1 drop into the left eye Every Night.      methylcellulose, Laxative, (CITRUCEL) 500 MG tablet tablet Take 2 tablets by mouth Every 4 (Four) Hours As Needed (constipation). Indications: supplement      metoprolol succinate XL (TOPROL-XL) 25 MG 24 hr tablet Take 0.5 tablets by mouth 2 (Two) Times a Day. Indications: High Blood Pressure  Disorder      Mirabegron ER (Myrbetriq) 50 MG tablet sustained-release 24 hour 24 hr tablet Take 50 mg by mouth Daily. Indications: Overactive Bladder 30 tablet 11    montelukast (SINGULAIR) 10 MG tablet Take 1 tablet by mouth Every Night.      Multiple Vitamins-Minerals (MULTIVITAMIN ADULT PO) Take 1 tablet by mouth Daily. Indications: supplement      pravastatin (PRAVACHOL) 40 MG tablet       timolol (TIMOPTIC) 0.5 % ophthalmic solution Administer 1 drop to both eyes 2 (Two) Times a Day. Indications: Wide-Angle Glaucoma         Allergies   Allergen Reactions    Clindamycin Rash    Penicillins Rash       Social History     Socioeconomic History    Marital status:    Tobacco Use    Smoking status: Never     Passive exposure: Never    Smokeless tobacco: Never   Vaping Use    Vaping Use: Never used   Substance and Sexual Activity    Alcohol use: No    Drug use: No    Sexual activity: Not Currently     Partners: Male     Birth control/protection: Hysterectomy       Family History   Problem Relation Age of Onset    Breast cancer Mother     Hypertension Mother     Diabetes Mother     Coronary artery disease Mother     Cancer Mother         Breast cancer. Both breasts    Heart disease Mother     Melanoma Father     Hypertension Father     Stroke Father     Coronary artery disease Father     Heart disease Father     Stroke Maternal Grandmother     Colon cancer Paternal Grandfather        Review of Systems   Constitutional:  Negative for chills, fever and unexpected weight change.   Respiratory:  Negative for shortness of breath.    Cardiovascular:  Negative for chest pain.   Gastrointestinal:  Negative for abdominal distention, abdominal pain, anal bleeding, blood in stool, constipation, diarrhea, nausea and vomiting.       Objective     Vitals:    02/08/24 1216   BP: 142/78   Pulse: 70   Temp: 97.1 °F (36.2 °C)   SpO2: 99%         02/08/24  1216   Weight: 51.7 kg (114 lb)     Body mass index is 21.19  kg/m².    Physical Exam  Vitals reviewed.   Constitutional:       General: She is not in acute distress.  Cardiovascular:      Rate and Rhythm: Normal rate and regular rhythm.      Heart sounds: Normal heart sounds.   Pulmonary:      Effort: Pulmonary effort is normal.      Breath sounds: Normal breath sounds.   Abdominal:      General: Bowel sounds are normal. There is no distension.      Palpations: Abdomen is soft.      Tenderness: There is no abdominal tenderness.   Skin:     General: Skin is warm and dry.   Neurological:      Mental Status: She is alert.         Imaging Results (Most Recent)       None            Assessment & Plan     Diagnoses and all orders for this visit:    1. Encounter for screening for malignant neoplasm of colon (Primary)  -     Case Request; Standing  -     Case Request    Other orders  -     Implement Anesthesia Orders Day of Procedure; Standing  -     Obtain Informed Consent; Standing            Schedule colonoscopy. Miralax prep.                COLONOSCOPY WITH ANESTHESIA (N/A)  All risks, benefits, alternatives, and indications of colonoscopy procedure have been discussed with the patient. Risks to include perforation of the colon requiring possible surgery or colostomy, risk of bleeding from biopsies or removal of colon tissue, possibility of missing a colon polyp or cancer, or adverse drug reaction.  Benefits to include the diagnosis and management of disease of the colon and rectum. Alternatives to include barium enema, radiographic evaluation, lab testing or no intervention. Pt verbalizes understanding and agrees.     There are no Patient Instructions on file for this visit.    JOANNE Vallejo

## 2024-02-09 PROBLEM — Z12.11 ENCOUNTER FOR SCREENING FOR MALIGNANT NEOPLASM OF COLON: Status: ACTIVE | Noted: 2024-02-08

## 2024-03-05 ENCOUNTER — ANESTHESIA EVENT (OUTPATIENT)
Dept: GASTROENTEROLOGY | Facility: HOSPITAL | Age: 79
End: 2024-03-05
Payer: MEDICARE

## 2024-03-05 ENCOUNTER — HOSPITAL ENCOUNTER (OUTPATIENT)
Facility: HOSPITAL | Age: 79
Setting detail: HOSPITAL OUTPATIENT SURGERY
Discharge: HOME | End: 2024-03-05
Attending: INTERNAL MEDICINE | Admitting: INTERNAL MEDICINE
Payer: MEDICARE

## 2024-03-05 ENCOUNTER — ANESTHESIA (OUTPATIENT)
Dept: GASTROENTEROLOGY | Facility: HOSPITAL | Age: 79
End: 2024-03-05
Payer: MEDICARE

## 2024-03-05 VITALS
RESPIRATION RATE: 17 BRPM | OXYGEN SATURATION: 97 % | SYSTOLIC BLOOD PRESSURE: 88 MMHG | BODY MASS INDEX: 20.65 KG/M2 | WEIGHT: 112.2 LBS | HEIGHT: 62 IN | DIASTOLIC BLOOD PRESSURE: 46 MMHG | HEART RATE: 68 BPM | TEMPERATURE: 97.2 F

## 2024-03-05 PROCEDURE — 25010000002 PROPOFOL 1000 MG/100ML EMULSION

## 2024-03-05 PROCEDURE — 25810000003 SODIUM CHLORIDE 0.9 % SOLUTION: Performed by: ANESTHESIOLOGY

## 2024-03-05 RX ORDER — SODIUM CHLORIDE 9 MG/ML
40 INJECTION, SOLUTION INTRAVENOUS AS NEEDED
Status: CANCELLED | OUTPATIENT
Start: 2024-03-05

## 2024-03-05 RX ORDER — PROPOFOL 10 MG/ML
INJECTION, EMULSION INTRAVENOUS AS NEEDED
Status: DISCONTINUED | OUTPATIENT
Start: 2024-03-05 | End: 2024-03-05 | Stop reason: SURG

## 2024-03-05 RX ORDER — SODIUM CHLORIDE 0.9 % (FLUSH) 0.9 %
10 SYRINGE (ML) INJECTION AS NEEDED
Status: DISCONTINUED | OUTPATIENT
Start: 2024-03-05 | End: 2024-03-05 | Stop reason: HOSPADM

## 2024-03-05 RX ORDER — SODIUM CHLORIDE 9 MG/ML
500 INJECTION, SOLUTION INTRAVENOUS CONTINUOUS PRN
Status: DISCONTINUED | OUTPATIENT
Start: 2024-03-05 | End: 2024-03-05 | Stop reason: HOSPADM

## 2024-03-05 RX ORDER — SODIUM CHLORIDE 0.9 % (FLUSH) 0.9 %
10 SYRINGE (ML) INJECTION EVERY 12 HOURS SCHEDULED
Status: CANCELLED | OUTPATIENT
Start: 2024-03-05

## 2024-03-05 RX ORDER — SODIUM CHLORIDE 9 MG/ML
100 INJECTION, SOLUTION INTRAVENOUS CONTINUOUS
Status: CANCELLED | OUTPATIENT
Start: 2024-03-05

## 2024-03-05 RX ORDER — SODIUM CHLORIDE 0.9 % (FLUSH) 0.9 %
10 SYRINGE (ML) INJECTION AS NEEDED
Status: CANCELLED | OUTPATIENT
Start: 2024-03-05

## 2024-03-05 RX ORDER — ONDANSETRON 2 MG/ML
4 INJECTION INTRAMUSCULAR; INTRAVENOUS ONCE AS NEEDED
Status: DISCONTINUED | OUTPATIENT
Start: 2024-03-05 | End: 2024-03-05 | Stop reason: HOSPADM

## 2024-03-05 RX ORDER — LIDOCAINE HYDROCHLORIDE 10 MG/ML
0.5 INJECTION, SOLUTION EPIDURAL; INFILTRATION; INTRACAUDAL; PERINEURAL ONCE AS NEEDED
Status: DISCONTINUED | OUTPATIENT
Start: 2024-03-05 | End: 2024-03-05 | Stop reason: HOSPADM

## 2024-03-05 RX ADMIN — LIDOCAINE HYDROCHLORIDE 50 MG: 20 INJECTION, SOLUTION INTRAVENOUS at 12:23

## 2024-03-05 RX ADMIN — SODIUM CHLORIDE 500 ML: 9 INJECTION, SOLUTION INTRAVENOUS at 11:33

## 2024-03-05 RX ADMIN — PROPOFOL 50 MG: 10 INJECTION, EMULSION INTRAVENOUS at 12:37

## 2024-03-05 RX ADMIN — PROPOFOL 200 MG: 10 INJECTION, EMULSION INTRAVENOUS at 12:23

## 2024-03-05 NOTE — ANESTHESIA PREPROCEDURE EVALUATION
Anesthesia Evaluation     NPO Solid Status: > 8 hours  NPO Liquid Status: > 8 hours           Airway   Mallampati: II  TM distance: >3 FB  Neck ROM: full  No difficulty expected  Dental - normal exam     Pulmonary - normal exam   Cardiovascular - normal exam    (+) hypertension well controlled less than 2 medications, valvular problems/murmurs MVP, hyperlipidemia      Neuro/Psych  (+) psychiatric history Anxiety  GI/Hepatic/Renal/Endo      Musculoskeletal     Abdominal    Substance History      OB/GYN          Other                          Anesthesia Plan    ASA 3     MAC     intravenous induction     Anesthetic plan, risks, benefits, and alternatives have been provided, discussed and informed consent has been obtained with: patient.    Plan discussed with CRNA.        CODE STATUS:

## 2024-03-05 NOTE — ANESTHESIA POSTPROCEDURE EVALUATION
"Patient: Michel Cruz    Procedure Summary       Date: 03/05/24 Room / Location: [facility] ENDOSCOPY 5 /  [facility]    Anesthesia Start: 1221 Anesthesia Stop: 1243    Procedure: COLONOSCOPY WITH ANESTHESIA Diagnosis:       Encounter for screening for malignant neoplasm of colon      (Encounter for screening for malignant neoplasm of colon [Z12.11])    Surgeons: Tripp Gage MD Provider: Wyatt Crockett CRNA    Anesthesia Type: MAC ASA Status: 3            Anesthesia Type: MAC    Vitals  Vitals Value Taken Time   /61 03/05/24 1301   Temp     Pulse 75 03/05/24 1301   Resp 17 03/05/24 1255   SpO2 100 % 03/05/24 1301   Vitals shown include unfiled device data.        Post Anesthesia Care and Evaluation    Patient location during evaluation: PHASE II  Patient participation: complete - patient participated  Level of consciousness: awake and alert  Pain score: 0  Pain management: adequate    Airway patency: patent  Anesthetic complications: No anesthetic complications  PONV Status: none  Cardiovascular status: stable and acceptable  Respiratory status: acceptable  Hydration status: acceptable    Comments: Blood pressure (!) 88/46, pulse 68, temperature 97.2 °F (36.2 °C), temperature source Temporal, resp. rate 17, height 156.2 cm (61.5\"), weight 50.9 kg (112 lb 3.2 oz), SpO2 97%.    No anesthesia care post op    "

## 2024-03-06 DIAGNOSIS — N32.81 OAB (OVERACTIVE BLADDER): ICD-10-CM

## 2024-03-06 DIAGNOSIS — N39.41 URGE INCONTINENCE: ICD-10-CM

## 2024-08-06 NOTE — PROGRESS NOTES
Subjective    Ms. Cruz is 79 y.o. female    Chief Complaint: Follow-up overactive bladder urge incontinence    History of Present Illness    79-year-old female established patient in for follow-up regarding overactive bladder and urge incontinence.  was increased to 50mg myrbetriq daily.  Continues to see improvement.  Is back to experiencing some nocturia on average 1-2 times nightly.  Compared to 2-4 times prior.  Has learned to limit fluid intake at least 4 hours prior to bed.  Reports has only had approximately 2 leakage episodes in many months.     Previously tried oxybutynin however experienced increased blurry vision and was quickly stopped as patient has a history of glaucoma.    The following portions of the patient's history were reviewed and updated as appropriate: allergies, current medications, past family history, past medical history, past social history, past surgical history and problem list.    Review of Systems   Constitutional:  Negative for chills and fever.   Gastrointestinal:  Negative for abdominal pain, anal bleeding, blood in stool, nausea and vomiting.   Genitourinary:  Positive for flank pain, frequency and urgency. Negative for dysuria and hematuria.         Current Outpatient Medications:     Ascorbic Acid (VITAMIN C PO), Take  by mouth Daily., Disp: , Rfl:     BIOTIN PO, Take  by mouth Daily., Disp: , Rfl:     Calcium Citrate-Vitamin D (CALCIUM + D PO), Take 600 mg by mouth Daily. Indications: supplement, Disp: , Rfl:     latanoprost (XALATAN) 0.005 % ophthalmic solution, Administer 1 drop into the left eye Every Night. Indications: Wide-Angle Glaucoma, Disp: , Rfl:     methylcellulose, Laxative, (CITRUCEL) 500 MG tablet tablet, Take 2 tablets by mouth Every 4 (Four) Hours As Needed (constipation). Indications: supplement, Disp: , Rfl:     metoprolol succinate XL (TOPROL-XL) 25 MG 24 hr tablet, Take 0.5 tablets by mouth 2 (Two) Times a Day. Indications: High Blood Pressure Disorder,  Disp: , Rfl:     Mirabegron ER (Myrbetriq) 50 MG tablet sustained-release 24 hour 24 hr tablet, Take 50 mg by mouth Daily. Indications: Overactive Bladder, Disp: 30 tablet, Rfl: 11    montelukast (SINGULAIR) 10 MG tablet, Take 1 tablet by mouth Every Night., Disp: , Rfl:     Multiple Vitamins-Minerals (MULTIVITAMIN ADULT PO), Take 1 tablet by mouth Daily. Indications: supplement, Disp: , Rfl:     pravastatin (PRAVACHOL) 40 MG tablet, , Disp: , Rfl:     timolol (TIMOPTIC) 0.5 % ophthalmic solution, Administer 1 drop to both eyes 2 (Two) Times a Day. Indications: Wide-Angle Glaucoma, Disp: , Rfl:     Past Medical History:   Diagnosis Date    Anxiety     Essential hypertension 2023    History of tachycardia     Hyperlipidemia     Mitral valve prolapse     Osteoporosis     Pityriasis rosea     Tachycardia     Urinary incontinence     Have trouble holding when need to empty bladder       Past Surgical History:   Procedure Laterality Date    BREAST BIOPSY       SECTION      CHOLECYSTECTOMY      COLONOSCOPY  2014    normal    COLONOSCOPY N/A 3/5/2024    Procedure: COLONOSCOPY WITH ANESTHESIA;  Surgeon: Tripp Gage MD;  Location: [facility] ENDOSCOPY;  Service: Gastroenterology;  Laterality: N/A;  pre screen  post diverticulosis  Dr. Wren    EYE SURGERY      GANGLION CYST EXCISION      x2    HYSTERECTOMY      TONSILLECTOMY         Social History     Socioeconomic History    Marital status:    Tobacco Use    Smoking status: Never     Passive exposure: Never    Smokeless tobacco: Never   Vaping Use    Vaping status: Never Used   Substance and Sexual Activity    Alcohol use: No    Drug use: No    Sexual activity: Not Currently     Partners: Male     Birth control/protection: Hysterectomy       Family History   Problem Relation Age of Onset    Breast cancer Mother     Hypertension Mother     Diabetes Mother     Coronary artery disease Mother     Cancer Mother         Breast cancer. Both  "breasts    Heart disease Mother     Melanoma Father     Hypertension Father     Stroke Father     Coronary artery disease Father     Heart disease Father     Stroke Maternal Grandmother     Colon cancer Paternal Grandfather        Objective    Temp 97.6 °F (36.4 °C)   Ht 156.2 cm (61.5\")   Wt 53.3 kg (117 lb 9.6 oz)   BMI 21.86 kg/m²     Physical Exam  Nursing note reviewed.   Constitutional:       General: She is not in acute distress.     Appearance: Normal appearance. She is not ill-appearing.   HENT:      Nose: No congestion.   Abdominal:      Tenderness: There is no right CVA tenderness or left CVA tenderness.      Hernia: No hernia is present.   Skin:     General: Skin is warm and dry.   Neurological:      Mental Status: She is alert and oriented to person, place, and time.   Psychiatric:         Mood and Affect: Mood normal.         Behavior: Behavior normal.             Results for orders placed or performed in visit on 08/14/24   POC Urinalysis Dipstick, Multipro    Specimen: Urine   Result Value Ref Range    Color Yellow Yellow, Straw, Dark Yellow, Chayo    Clarity, UA Clear Clear    Glucose, UA Negative Negative mg/dL    Bilirubin Negative Negative    Ketones, UA Negative Negative    Specific Gravity  1.030 1.005 - 1.030    Blood, UA Negative Negative    pH, Urine 5.5 5.0 - 8.0    Protein, POC Negative Negative mg/dL    Urobilinogen, UA 0.2 E.U./dL Normal, 0.2 E.U./dL    Nitrite, UA Negative Negative    Leukocytes Negative Negative   Estimation of residual urine via abdominal ultrasound  Residual Urine: 0 ml  Indication: frequency  Position: Supine  Examination: Incremental scanning of the suprapubic area using 3 MHz transducer using copious amounts of acoustic gel.   Findings: An anechoic area was demonstrated which represented the bladder, with measurement of residual urine as noted. I inspected this myself. In that the residual urine was stable or insignificant, no treatment will be necessary at " this time.      Assessment and Plan    Diagnoses and all orders for this visit:    1. OAB (overactive bladder) (Primary)  -     POC Urinalysis Dipstick, Multipro    2. Urge incontinence  -     POC Urinalysis Dipstick, Multipro      Myrbetriq 50 mg daily continues to work well for patient.  Patient has reduced fluid intake at least 4 hours prior to bed which also is helping     Will continue myrbetriq 50mg daily     F/u in 1 year

## 2024-08-14 ENCOUNTER — OFFICE VISIT (OUTPATIENT)
Dept: UROLOGY | Facility: CLINIC | Age: 79
End: 2024-08-14
Payer: MEDICARE

## 2024-08-14 VITALS — BODY MASS INDEX: 22.2 KG/M2 | TEMPERATURE: 97.6 F | HEIGHT: 61 IN | WEIGHT: 117.6 LBS

## 2024-08-14 DIAGNOSIS — N32.81 OAB (OVERACTIVE BLADDER): Primary | ICD-10-CM

## 2024-08-14 DIAGNOSIS — N39.41 URGE INCONTINENCE: ICD-10-CM

## 2024-08-14 LAB
BILIRUB BLD-MCNC: NEGATIVE MG/DL
CLARITY, POC: CLEAR
COLOR UR: YELLOW
GLUCOSE UR STRIP-MCNC: NEGATIVE MG/DL
KETONES UR QL: NEGATIVE
LEUKOCYTE EST, POC: NEGATIVE
NITRITE UR-MCNC: NEGATIVE MG/ML
PH UR: 5.5 [PH] (ref 5–8)
PROT UR STRIP-MCNC: NEGATIVE MG/DL
RBC # UR STRIP: NEGATIVE /UL
SP GR UR: 1.03 (ref 1–1.03)
UROBILINOGEN UR QL: NORMAL

## 2025-02-20 DIAGNOSIS — N32.81 OAB (OVERACTIVE BLADDER): ICD-10-CM

## 2025-02-20 DIAGNOSIS — N39.41 URGE INCONTINENCE: ICD-10-CM

## 2025-02-20 RX ORDER — MIRABEGRON 50 MG/1
50 TABLET, FILM COATED, EXTENDED RELEASE ORAL DAILY
Qty: 30 TABLET | Refills: 3 | Status: SHIPPED | OUTPATIENT
Start: 2025-02-20

## 2025-07-17 DIAGNOSIS — N39.41 URGE INCONTINENCE: ICD-10-CM

## 2025-07-17 DIAGNOSIS — N32.81 OAB (OVERACTIVE BLADDER): ICD-10-CM

## 2025-07-17 RX ORDER — MIRABEGRON 50 MG/1
50 TABLET, FILM COATED, EXTENDED RELEASE ORAL DAILY
Qty: 30 TABLET | Refills: 1 | Status: SHIPPED | OUTPATIENT
Start: 2025-07-17

## 2025-07-21 ENCOUNTER — OFFICE VISIT (OUTPATIENT)
Dept: CARDIOLOGY | Facility: CLINIC | Age: 80
End: 2025-07-21
Payer: MEDICARE

## 2025-07-21 VITALS
HEIGHT: 61 IN | BODY MASS INDEX: 20.77 KG/M2 | WEIGHT: 110 LBS | HEART RATE: 60 BPM | DIASTOLIC BLOOD PRESSURE: 73 MMHG | SYSTOLIC BLOOD PRESSURE: 151 MMHG

## 2025-07-21 DIAGNOSIS — E78.2 MIXED HYPERLIPIDEMIA: ICD-10-CM

## 2025-07-21 DIAGNOSIS — R06.09 DOE (DYSPNEA ON EXERTION): ICD-10-CM

## 2025-07-21 DIAGNOSIS — R07.9 CHEST PAIN IN ADULT: ICD-10-CM

## 2025-07-21 DIAGNOSIS — I10 ESSENTIAL HYPERTENSION: Primary | ICD-10-CM

## 2025-07-21 PROCEDURE — 93000 ELECTROCARDIOGRAM COMPLETE: CPT | Performed by: INTERNAL MEDICINE

## 2025-07-21 PROCEDURE — 99204 OFFICE O/P NEW MOD 45 MIN: CPT | Performed by: INTERNAL MEDICINE

## 2025-07-21 PROCEDURE — 3077F SYST BP >= 140 MM HG: CPT | Performed by: INTERNAL MEDICINE

## 2025-07-21 PROCEDURE — 3078F DIAST BP <80 MM HG: CPT | Performed by: INTERNAL MEDICINE

## 2025-07-21 RX ORDER — NITROGLYCERIN 0.4 MG/1
0.4 TABLET SUBLINGUAL
OUTPATIENT
Start: 2025-07-21 | End: 2025-07-21

## 2025-07-21 RX ORDER — SODIUM CHLORIDE 9 MG/ML
40 INJECTION, SOLUTION INTRAVENOUS AS NEEDED
OUTPATIENT
Start: 2025-07-21

## 2025-07-21 RX ORDER — METOPROLOL TARTRATE 25 MG/1
100 TABLET, FILM COATED ORAL ONCE
OUTPATIENT
Start: 2025-07-21

## 2025-07-21 RX ORDER — METOPROLOL TARTRATE 25 MG/1
150 TABLET, FILM COATED ORAL ONCE
OUTPATIENT
Start: 2025-07-21

## 2025-07-21 RX ORDER — SODIUM CHLORIDE 0.9 % (FLUSH) 0.9 %
10 SYRINGE (ML) INJECTION EVERY 12 HOURS SCHEDULED
OUTPATIENT
Start: 2025-07-21

## 2025-07-21 RX ORDER — METOPROLOL TARTRATE 25 MG/1
200 TABLET, FILM COATED ORAL ONCE
OUTPATIENT
Start: 2025-07-21 | End: 2025-07-21

## 2025-07-21 RX ORDER — METOPROLOL TARTRATE 25 MG/1
50 TABLET, FILM COATED ORAL ONCE
OUTPATIENT
Start: 2025-07-21

## 2025-07-21 RX ORDER — DIAZEPAM 2 MG/1
1 TABLET ORAL 2 TIMES DAILY PRN
COMMUNITY

## 2025-07-21 RX ORDER — SODIUM CHLORIDE 0.9 % (FLUSH) 0.9 %
10 SYRINGE (ML) INJECTION AS NEEDED
OUTPATIENT
Start: 2025-07-21

## 2025-07-21 RX ORDER — METOPROLOL TARTRATE 50 MG
50 TABLET ORAL
OUTPATIENT
Start: 2025-07-21

## 2025-07-21 RX ORDER — NITROGLYCERIN 0.4 MG/1
0.8 TABLET SUBLINGUAL
OUTPATIENT
Start: 2025-07-21

## 2025-07-21 RX ORDER — METOPROLOL TARTRATE 1 MG/ML
5 INJECTION, SOLUTION INTRAVENOUS
OUTPATIENT
Start: 2025-07-21

## 2025-07-21 RX ORDER — ROSUVASTATIN CALCIUM 40 MG/1
40 TABLET, COATED ORAL DAILY
COMMUNITY
Start: 2025-06-09

## 2025-07-21 NOTE — PROGRESS NOTES
Michel Cruz  [id number]  1945  80 y.o.  female    Referring Provider: Isrrael Wren MD    Reason for  Visit:  Initial visit       Subjective     Chest pain both with exertion as well as at rest.  Feels episodes of chest pain occur more often with exertion  Sometimes wakes up at night with chest pain   Moderate substernal,   Pressure like   Chest pain non pleuritic  Chest pain non positional and non gustatory   Lasts less than 5-10 minutes  Started more than 3-4 months ago  Occurs once or twice a week on the average but can be variable in frequency  Associated diaphoresis  No associated nausea  No radiation  Relieved with rest or spontaneously  Not positional  No change with intake of food or antacids  No change with breathing  Mild to moderate associated dyspnea    No similar chest pain episodes in the past   Easy fatiguability and increasing tired  Feels energy levels running low    BP well controlled at home.    BP in clinic as below        History of present illness:  Michel Cruz is a 80 y.o. yo female with essential hypertension  who presents today for   Chief Complaint   Patient presents with    Establish Care    Chest Pain   .    History  Past Medical History:   Diagnosis Date    Anxiety     Diverticulitis of colon     Essential hypertension 2023    History of tachycardia     Hyperlipidemia     Irritable bowel syndrome     Mitral valve prolapse     Osteoporosis     Pityriasis rosea     Tachycardia     Urinary incontinence     Have trouble holding when need to empty bladder   ,   Past Surgical History:   Procedure Laterality Date    BREAST BIOPSY       SECTION      CHOLECYSTECTOMY      COLONOSCOPY  2014    normal    COLONOSCOPY N/A 3/5/2024    Procedure: COLONOSCOPY WITH ANESTHESIA;  Surgeon: Tripp Gage MD;  Location: [facility] ENDOSCOPY;  Service: Gastroenterology;  Laterality: N/A;  pre screen  post diverticulosis  Dr. Wren    EYE SURGERY      GANGLION CYST  EXCISION      x2    HYSTERECTOMY      TONSILLECTOMY     ,   Family History   Problem Relation Age of Onset    Breast cancer Mother     Hypertension Mother     Diabetes Mother     Coronary artery disease Mother     Cancer Mother         Breast cancer. Both breasts    Heart disease Mother     Melanoma Father     Hypertension Father     Stroke Father     Coronary artery disease Father     Heart disease Father     Stroke Maternal Grandmother     Colon cancer Paternal Grandfather    ,   Social History     Tobacco Use    Smoking status: Never     Passive exposure: Never    Smokeless tobacco: Never   Vaping Use    Vaping status: Never Used   Substance Use Topics    Alcohol use: Never    Drug use: Never   ,     Medications  Current Outpatient Medications   Medication Sig Dispense Refill    Ascorbic Acid (VITAMIN C PO) Take  by mouth Daily.      BIOTIN PO Take  by mouth Daily.      Calcium Citrate-Vitamin D (CALCIUM + D PO) Take 600 mg by mouth Daily. Indications: supplement      diazePAM (VALIUM) 2 MG tablet Take 0.5 tablets by mouth 2 (Two) Times a Day As Needed for Anxiety.      latanoprost (XALATAN) 0.005 % ophthalmic solution Administer 1 drop into the left eye Every Night. Indications: Wide-Angle Glaucoma      methylcellulose, Laxative, (CITRUCEL) 500 MG tablet tablet Take 2 tablets by mouth Every 4 (Four) Hours As Needed (constipation). Indications: supplement      metoprolol succinate XL (TOPROL-XL) 25 MG 24 hr tablet Take 1 tablet by mouth 2 (Two) Times a Day. Indications: High Blood Pressure      Mirabegron ER (MYRBETRIQ) 50 MG tablet sustained-release 24 hour 24 hr tablet TAKE ONE TABLET  DAILY 30 tablet 1    montelukast (SINGULAIR) 10 MG tablet Take 1 tablet by mouth Every Night.      Multiple Vitamins-Minerals (MULTIVITAMIN ADULT PO) Take 1 tablet by mouth Daily. Indications: supplement      rosuvastatin (CRESTOR) 40 MG tablet Take 1 tablet by mouth Daily.      timolol (TIMOPTIC) 0.5 % ophthalmic solution  "Administer 1 drop to both eyes 2 (Two) Times a Day. Indications: Wide-Angle Glaucoma       No current facility-administered medications for this visit.       Allergies:  Clindamycin and Penicillins    Review of Systems  Review of Systems   Constitutional: Negative.   HENT: Negative.     Eyes: Negative.    Cardiovascular:  Positive for chest pain and dyspnea on exertion. Negative for claudication, cyanosis, irregular heartbeat, leg swelling, near-syncope, orthopnea, palpitations, paroxysmal nocturnal dyspnea and syncope.   Respiratory: Negative.     Endocrine: Negative.    Hematologic/Lymphatic: Negative.    Skin: Negative.    Gastrointestinal:  Negative for anorexia.   Genitourinary: Negative.    Neurological: Negative.    Psychiatric/Behavioral: Negative.         Objective     Physical Exam:  /73   Pulse 60   Ht 154.9 cm (61\")   Wt 49.9 kg (110 lb)   BMI 20.78 kg/m²     Physical Exam  Constitutional:       Appearance: She is well-developed.   HENT:      Head: Normocephalic.   Neck:      Vascular: Normal carotid pulses. No carotid bruit or JVD.      Trachea: No tracheal tenderness or tracheal deviation.   Cardiovascular:      Rate and Rhythm: Regular rhythm.      Pulses: Normal pulses.      Heart sounds: Normal heart sounds.   Pulmonary:      Effort: Pulmonary effort is normal.      Breath sounds: No stridor.   Abdominal:      Palpations: Abdomen is soft.   Musculoskeletal:      Cervical back: No edema.   Skin:     General: Skin is warm.   Neurological:      Mental Status: She is alert.      Cranial Nerves: No cranial nerve deficit.      Sensory: No sensory deficit.   Psychiatric:         Speech: Speech normal.         Behavior: Behavior normal.         Results Review:     ____________________________________________________________________________________________________________________________________________  Health maintenance and recommendations    Low salt/ HTN/ Heart healthy carbohydrate restricted " cardiac diet   The patient is advised to reduce or avoid caffeine or other cardiac stimulants.   Minimize or avoid  NSAID-type medications      Monitor for any signs of bleeding including red or dark stools. Fall precautions.   Advised staying uptodate with immunizations per established standard guidelines.    Offered to give patient  a copy of my notes     Questions were encouraged, asked and answered to the patient's  understanding and satisfaction. Questions if any regarding current medications and side effects, need for refills and importance of compliance to medications stressed.    Reviewed available prior notes, consults, prior visits, laboratory findings, radiology and cardiology relevant reports. Updated chart as applicable. I have reviewed the patient's medical history in detail and updated the computerized patient record as relevant.      Updated patient regarding any new or relevant abnormalities on review of records or any new findings on physical exam. Mentioned to patient about purpose of visit and desirable health short and long term goals and objectives.    Primary to monitor CBC CMP Lipid panel and TSH as applicable    ___________________________________________________________________________________________________________________________________________     ECG 12 Lead    Date/Time: 7/21/2025 9:24 AM  Performed by: Nas Olsen MD    Authorized by: Nas Olsen MD  Comparison: compared with previous ECG from 10/29/2024  Comparison to previous ECG: Ventricular rate changed from 67  to 64  beats per minute      Rhythm: sinus rhythm  Rate: normal  Conduction: conduction normal  ST Segments: ST segments normal  T Waves: T waves normal  QRS axis: normal    Clinical impression: normal ECG          Assessment & Plan   Diagnoses and all orders for this visit:    1. Essential hypertension (Primary)    2. Chest pain in adult  -     CT Angiogram Coronary; Future  -     CT Angiogram Coronary-Cardiology  Interpretation; Future  -     metoprolol tartrate (LOPRESSOR) tablet 200 mg  -     metoprolol tartrate (LOPRESSOR) tablet 150 mg  -     metoprolol tartrate (LOPRESSOR) tablet 100 mg  -     metoprolol tartrate (LOPRESSOR) tablet 50 mg  -     metoprolol tartrate (LOPRESSOR) tablet 50 mg  -     metoprolol tartrate (LOPRESSOR) injection 5 mg  -     nitroglycerin (NITROSTAT) SL tablet 0.4 mg  -     nitroglycerin (NITROSTAT) SL tablet 0.8 mg  -     No Caffeine or Nicotine 4 Hours Prior to CTA Appointment  -     Nothing to Eat or Drink 4 Hours Prior to CTA Appointment  -     Do Not Take Phosphodiasterase Inhibitors in the 72 Hours Prior to Coronary CTA  -     Obtain Informed Consent - Computed Tomography Angiography of Chest - Angiogram of Coronary Arteries; Standing  -     Vital Signs Upon Arrival; Standing  -     Cardiac Monitoring; Standing  -     Verify NPO Status - Patient to Be NPO at Least 4 Hours Prior to CTA; Standing  -     Notify CT After Administration of metoprolol tartrate (LOPRESSOR) tablet; Standing  -     Notify Provider If Total Metoprolol Given Equals 300mg & Heart Rate Not At Goal; Standing  -     Notify Provider Prior to Administration of Nitroglycerin if Patient SBP <80; Standing  -     POC Creatinine; Standing  -     Insert Peripheral IV; Standing  -     Saline Lock & Maintain IV Access; Standing  -     sodium chloride 0.9 % flush 10 mL  -     sodium chloride 0.9 % flush 10 mL  -     sodium chloride 0.9 % infusion 40 mL  -     Vital Signs - See Instructions; Standing  -     Hold Medication Metformin (Glucophage, Glucophage XR, Fortament, Glumetza);  Metglip (metformin/glipizide);  Glucovance (metformin/glyburide); Avandamet (metformin/rosiglitazone); Standing  -     Patient May Discharge Home After Procedure Complete (If Stable); Standing  -     Adult Transthoracic Echo Complete W/ Cont if Necessary Per Protocol; Future    3. PAZ (dyspnea on exertion)  -     Adult Transthoracic Echo Complete W/ Cont  if Necessary Per Protocol; Future    4. Mixed hyperlipidemia    Other orders  -     ECG 12 Lead          Plan    Patient expressed understanding  Encouraged and answered all questions   Discussed with the patient and all questioned fully answered. He will call me if any problems arise.   Discussed results of ECG available for review     Orders Placed This Encounter   Procedures    CT Angiogram Coronary     Standing Status:   Future     Expected Date:   7/22/2025     Expiration Date:   10/21/2026     Scheduling Instructions:      Scan on [employer] ONLY     Reason for Exam::   cp     Indication for Coronary CTA:   Chest Pain - Possibly Cardiac     History of CABG or Coronary Stent:   No     Release to patient:   Routine Release [1400000002]    No Caffeine or Nicotine 4 Hours Prior to CTA Appointment    Nothing to Eat or Drink 4 Hours Prior to CTA Appointment    Do Not Take Phosphodiasterase Inhibitors in the 72 Hours Prior to Coronary CTA     Examples of these medications: Sildenafil (Viagra), Tadalafil (Cialis), Avanafil (Stendra), Vardenafil (Levitra)    ECG 12 Lead     This order was created via procedure documentation     Release to patient:   Routine Release [1400000002]    Adult Transthoracic Echo Complete W/ Cont if Necessary Per Protocol     Standing Status:   Future     Expected Date:   7/26/2025     Expiration Date:   10/21/2026     Scheduling Instructions:      Myocardial strain to be performed       Use newer echo machine     Reason for exam?:   Chest Pain     Reason for exam?:   Dyspnea     Release to patient:   Routine Release [1400000002]        Check BP and heart rates twice daily initially till blood pressures and heart rates under good control and then at least 3x / week,   If blood pressures continue to be well-controlled then can check week a month  at home and bring a recording for review next visit  If BP >130/85 or < 100/60 persistently over 3 reading 30 mins apart or if heart rates  persistently above 100 bpm or less than 55 bpm call sooner for evaluation and advise     Keep LDL below 70 mg/dl. Monitor liver and renal functions.   Monitor CBC, CMP, TSH (as indicated) and Lipid Panel by primary             Return in about 6 weeks (around 9/1/2025).

## 2025-07-29 ENCOUNTER — TELEPHONE (OUTPATIENT)
Dept: CT IMAGING | Facility: HOSPITAL | Age: 80
End: 2025-07-29
Payer: MEDICARE

## 2025-07-30 ENCOUNTER — HOSPITAL ENCOUNTER (OUTPATIENT)
Dept: CT IMAGING | Facility: HOSPITAL | Age: 80
Discharge: HOME | End: 2025-07-30
Payer: MEDICARE

## 2025-07-30 VITALS
SYSTOLIC BLOOD PRESSURE: 122 MMHG | TEMPERATURE: 96.6 F | HEIGHT: 62 IN | HEART RATE: 65 BPM | RESPIRATION RATE: 13 BRPM | BODY MASS INDEX: 21.79 KG/M2 | DIASTOLIC BLOOD PRESSURE: 58 MMHG | OXYGEN SATURATION: 99 % | WEIGHT: 118.4 LBS

## 2025-07-30 DIAGNOSIS — R07.9 CHEST PAIN IN ADULT: ICD-10-CM

## 2025-07-30 LAB — CREAT BLDA-MCNC: 0.9 MG/DL (ref 0.6–1.3)

## 2025-07-30 PROCEDURE — A9270 NON-COVERED ITEM OR SERVICE: HCPCS | Performed by: INTERNAL MEDICINE

## 2025-07-30 PROCEDURE — 25510000001 IOPAMIDOL PER 1 ML: Performed by: INTERNAL MEDICINE

## 2025-07-30 PROCEDURE — 82565 ASSAY OF CREATININE: CPT | Performed by: INTERNAL MEDICINE

## 2025-07-30 PROCEDURE — 63710000001 NITROGLYCERIN 0.4 MG SUBLINGUAL TABLET: Performed by: INTERNAL MEDICINE

## 2025-07-30 PROCEDURE — 75574 CT ANGIO HRT W/3D IMAGE: CPT

## 2025-07-30 RX ORDER — METOPROLOL TARTRATE 50 MG
50 TABLET ORAL ONCE
Status: DISCONTINUED | OUTPATIENT
Start: 2025-07-30 | End: 2025-07-31 | Stop reason: HOSPADM

## 2025-07-30 RX ORDER — METOPROLOL TARTRATE 1 MG/ML
5 INJECTION, SOLUTION INTRAVENOUS
Status: DISCONTINUED | OUTPATIENT
Start: 2025-07-30 | End: 2025-07-31 | Stop reason: HOSPADM

## 2025-07-30 RX ORDER — SODIUM CHLORIDE 0.9 % (FLUSH) 0.9 %
10 SYRINGE (ML) INJECTION EVERY 12 HOURS SCHEDULED
Status: DISCONTINUED | OUTPATIENT
Start: 2025-07-30 | End: 2025-07-31 | Stop reason: HOSPADM

## 2025-07-30 RX ORDER — SODIUM CHLORIDE 9 MG/ML
40 INJECTION, SOLUTION INTRAVENOUS AS NEEDED
Status: DISCONTINUED | OUTPATIENT
Start: 2025-07-30 | End: 2025-07-31 | Stop reason: HOSPADM

## 2025-07-30 RX ORDER — NITROGLYCERIN 0.4 MG/1
0.4 TABLET SUBLINGUAL
Status: COMPLETED | OUTPATIENT
Start: 2025-07-30 | End: 2025-07-30

## 2025-07-30 RX ORDER — METOPROLOL TARTRATE 100 MG/1
100 TABLET ORAL ONCE
Status: DISCONTINUED | OUTPATIENT
Start: 2025-07-30 | End: 2025-07-31 | Stop reason: HOSPADM

## 2025-07-30 RX ORDER — METOPROLOL TARTRATE 50 MG
50 TABLET ORAL
Status: DISCONTINUED | OUTPATIENT
Start: 2025-07-30 | End: 2025-07-31 | Stop reason: HOSPADM

## 2025-07-30 RX ORDER — IOPAMIDOL 755 MG/ML
100 INJECTION, SOLUTION INTRAVASCULAR
Status: COMPLETED | OUTPATIENT
Start: 2025-07-30 | End: 2025-07-30

## 2025-07-30 RX ORDER — SODIUM CHLORIDE 0.9 % (FLUSH) 0.9 %
10 SYRINGE (ML) INJECTION AS NEEDED
Status: DISCONTINUED | OUTPATIENT
Start: 2025-07-30 | End: 2025-07-31 | Stop reason: HOSPADM

## 2025-07-30 RX ORDER — METOPROLOL TARTRATE 100 MG/1
200 TABLET ORAL ONCE
Status: DISCONTINUED | OUTPATIENT
Start: 2025-07-30 | End: 2025-07-31 | Stop reason: HOSPADM

## 2025-07-30 RX ORDER — NITROGLYCERIN 0.4 MG/1
0.8 TABLET SUBLINGUAL
Status: COMPLETED | OUTPATIENT
Start: 2025-07-30 | End: 2025-07-30

## 2025-07-30 RX ADMIN — NITROGLYCERIN 0.8 MG: 0.4 TABLET SUBLINGUAL at 10:30

## 2025-07-30 RX ADMIN — IOPAMIDOL 100 ML: 755 INJECTION, SOLUTION INTRAVENOUS at 10:46

## 2025-08-04 ENCOUNTER — HOSPITAL ENCOUNTER (OUTPATIENT)
Dept: CT IMAGING | Facility: HOSPITAL | Age: 80
Discharge: HOME | End: 2025-08-04
Admitting: INTERNAL MEDICINE
Payer: MEDICARE

## 2025-08-04 DIAGNOSIS — R93.1 ABNORMAL FINDINGS DIAGNOSTIC IMAGING OF HEART AND CORONARY CIRCULATION: ICD-10-CM

## 2025-08-04 DIAGNOSIS — R93.1 ABNORMAL FINDINGS DIAGNOSTIC IMAGING OF HEART AND CORONARY CIRCULATION: Primary | ICD-10-CM

## 2025-08-04 PROCEDURE — 75580 N-INVAS EST C FFR SW ALY CTA: CPT

## 2025-08-14 ENCOUNTER — OFFICE VISIT (OUTPATIENT)
Dept: UROLOGY | Facility: CLINIC | Age: 80
End: 2025-08-14
Payer: MEDICARE

## 2025-08-14 VITALS — WEIGHT: 118 LBS | TEMPERATURE: 97.9 F | HEIGHT: 61 IN | BODY MASS INDEX: 22.28 KG/M2

## 2025-08-14 DIAGNOSIS — N32.81 OAB (OVERACTIVE BLADDER): Primary | ICD-10-CM

## 2025-08-14 DIAGNOSIS — N39.41 URGE INCONTINENCE: ICD-10-CM

## 2025-08-14 LAB
BILIRUB BLD-MCNC: NEGATIVE MG/DL
CLARITY, POC: CLEAR
COLOR UR: YELLOW
GLUCOSE UR STRIP-MCNC: NEGATIVE MG/DL
KETONES UR QL: NEGATIVE
LEUKOCYTE EST, POC: NEGATIVE
NITRITE UR-MCNC: NEGATIVE MG/ML
PH UR: 7 [PH] (ref 5–8)
PROT UR STRIP-MCNC: NEGATIVE MG/DL
RBC # UR STRIP: NEGATIVE /UL
SP GR UR: 1.02 (ref 1–1.03)
UROBILINOGEN UR QL: NORMAL

## 2025-08-14 RX ORDER — MIRABEGRON 50 MG/1
50 TABLET, FILM COATED, EXTENDED RELEASE ORAL DAILY
Qty: 30 TABLET | Refills: 11 | Status: SHIPPED | OUTPATIENT
Start: 2025-08-14

## 2025-08-26 ENCOUNTER — HOSPITAL ENCOUNTER (OUTPATIENT)
Dept: CARDIOLOGY | Facility: HOSPITAL | Age: 80
Discharge: HOME | End: 2025-08-26
Admitting: INTERNAL MEDICINE
Payer: MEDICARE

## 2025-08-26 VITALS
DIASTOLIC BLOOD PRESSURE: 58 MMHG | SYSTOLIC BLOOD PRESSURE: 122 MMHG | WEIGHT: 118 LBS | HEIGHT: 62 IN | BODY MASS INDEX: 21.71 KG/M2

## 2025-08-26 DIAGNOSIS — R06.09 DOE (DYSPNEA ON EXERTION): ICD-10-CM

## 2025-08-26 DIAGNOSIS — R07.9 CHEST PAIN IN ADULT: ICD-10-CM

## 2025-08-26 PROCEDURE — 93306 TTE W/DOPPLER COMPLETE: CPT

## 2025-08-26 PROCEDURE — 93356 MYOCRD STRAIN IMG SPCKL TRCK: CPT

## 2025-08-31 LAB
AORTIC ARCH: 2.5 CM
AORTIC DIMENSIONLESS INDEX: 0.74 (DI)
ASCENDING AORTA: 3.1 CM
AV MEAN PRESS GRAD SYS DOP V1V2: 2.7 MMHG
AV VMAX SYS DOP: 119 CM/SEC
BH CV ECHO LEFT VENTRICLE GLOBAL LONGITUDINAL STRAIN: -22.4 %
BH CV ECHO MEAS - AO MAX PG: 5.7 MMHG
BH CV ECHO MEAS - AO ROOT DIAM: 2.8 CM
BH CV ECHO MEAS - AO V2 VTI: 28.5 CM
BH CV ECHO MEAS - AVA(I,D): 2.33 CM2
BH CV ECHO MEAS - EDV(CUBED): 51.9 ML
BH CV ECHO MEAS - EDV(MOD-SP2): 39 ML
BH CV ECHO MEAS - EDV(MOD-SP4): 33.3 ML
BH CV ECHO MEAS - EF(MOD-SP2): 76.7 %
BH CV ECHO MEAS - EF(MOD-SP4): 81.7 %
BH CV ECHO MEAS - ESV(CUBED): 9 ML
BH CV ECHO MEAS - ESV(MOD-SP2): 9.1 ML
BH CV ECHO MEAS - ESV(MOD-SP4): 6.1 ML
BH CV ECHO MEAS - FS: 44.2 %
BH CV ECHO MEAS - IVS/LVPW: 1.05 CM
BH CV ECHO MEAS - IVSD: 0.8 CM
BH CV ECHO MEAS - LA DIMENSION: 3.2 CM
BH CV ECHO MEAS - LAT PEAK E' VEL: 7.2 CM/SEC
BH CV ECHO MEAS - LV DIASTOLIC VOL/BSA (35-75): 21.8 CM2
BH CV ECHO MEAS - LV MASS(C)D: 80.9 GRAMS
BH CV ECHO MEAS - LV MAX PG: 2.7 MMHG
BH CV ECHO MEAS - LV MEAN PG: 2 MMHG
BH CV ECHO MEAS - LV SYSTOLIC VOL/BSA (12-30): 4 CM2
BH CV ECHO MEAS - LV V1 MAX: 82.1 CM/SEC
BH CV ECHO MEAS - LV V1 VTI: 21.1 CM
BH CV ECHO MEAS - LVIDD: 3.7 CM
BH CV ECHO MEAS - LVIDS: 2.08 CM
BH CV ECHO MEAS - LVOT AREA: 3.1 CM2
BH CV ECHO MEAS - LVOT DIAM: 2 CM
BH CV ECHO MEAS - LVPWD: 0.76 CM
BH CV ECHO MEAS - MED PEAK E' VEL: 9.7 CM/SEC
BH CV ECHO MEAS - MR MAX PG: 99.5 MMHG
BH CV ECHO MEAS - MR MAX VEL: 498 CM/SEC
BH CV ECHO MEAS - MV A MAX VEL: 60 CM/SEC
BH CV ECHO MEAS - MV DEC TIME: 0.3 SEC
BH CV ECHO MEAS - MV E MAX VEL: 94.9 CM/SEC
BH CV ECHO MEAS - MV E/A: 1.58
BH CV ECHO MEAS - PA V2 MAX: 76.3 CM/SEC
BH CV ECHO MEAS - PI END-D VEL: 82.5 CM/SEC
BH CV ECHO MEAS - RV MAX PG: 1.83 MMHG
BH CV ECHO MEAS - RV V1 MAX: 67.5 CM/SEC
BH CV ECHO MEAS - SV(LVOT): 66.3 ML
BH CV ECHO MEAS - SV(MOD-SP2): 29.9 ML
BH CV ECHO MEAS - SV(MOD-SP4): 27.2 ML
BH CV ECHO MEAS - SVI(LVOT): 43.4 ML/M2
BH CV ECHO MEAS - SVI(MOD-SP2): 19.6 ML/M2
BH CV ECHO MEAS - SVI(MOD-SP4): 17.8 ML/M2
BH CV ECHO MEAS - TAPSE (>1.6): 1.11 CM
BH CV ECHO MEAS - TR MAX VEL: 201 CM/SEC
BH CV ECHO MEASUREMENTS AVERAGE E/E' RATIO: 11.23
BH CV XLRA - RV BASE: 1.95 CM
BH CV XLRA - RV LENGTH: 6.1 CM
BH CV XLRA - RV MID: 1.08 CM
BH CV XLRA - TDI S': 12 CM/SEC
LEFT ATRIUM VOLUME INDEX: 24.8 ML/M2
LEFT ATRIUM VOLUME: 37.9 ML
LV EF BIPLANE MOD: 80.8 %

## (undated) DEVICE — YANKAUER,BULB TIP WITH VENT: Brand: ARGYLE

## (undated) DEVICE — Device: Brand: DEFENDO AIR/WATER/SUCTION AND BIOPSY VALVE

## (undated) DEVICE — SENSR O2 OXIMAX FNGR A/ 18IN NONSTR

## (undated) DEVICE — MASK,OXYGEN,MED CONC,ADLT,7' TUB, UC: Brand: PENDING

## (undated) DEVICE — THE CHANNEL CLEANING BRUSH IS A NYLON FLEXI BRUSH ATTACHED TO A FLEXIBLE PLASTIC SHEATH DESIGNED TO SAFELY REMOVE DEBRIS FROM FLEXIBLE ENDOSCOPES.

## (undated) DEVICE — CUFF,BP,DISP,1 TUBE,ADULT,HP: Brand: MEDLINE